# Patient Record
Sex: MALE | Race: WHITE | NOT HISPANIC OR LATINO
[De-identification: names, ages, dates, MRNs, and addresses within clinical notes are randomized per-mention and may not be internally consistent; named-entity substitution may affect disease eponyms.]

---

## 2019-10-15 ENCOUNTER — APPOINTMENT (OUTPATIENT)
Dept: GASTROENTEROLOGY | Facility: CLINIC | Age: 74
End: 2019-10-15
Payer: MEDICARE

## 2019-10-15 VITALS
WEIGHT: 141 LBS | SYSTOLIC BLOOD PRESSURE: 120 MMHG | HEART RATE: 41 BPM | BODY MASS INDEX: 22.13 KG/M2 | OXYGEN SATURATION: 98 % | HEIGHT: 67 IN | DIASTOLIC BLOOD PRESSURE: 70 MMHG

## 2019-10-15 DIAGNOSIS — C49.9 MALIGNANT NEOPLASM OF CONNECTIVE AND SOFT TISSUE, UNSPECIFIED: ICD-10-CM

## 2019-10-15 DIAGNOSIS — K57.90 DIVERTICULOSIS OF INTESTINE, PART UNSPECIFIED, W/OUT PERFORATION OR ABSCESS W/OUT BLEEDING: ICD-10-CM

## 2019-10-15 DIAGNOSIS — K86.89 OTHER SPECIFIED DISEASES OF PANCREAS: ICD-10-CM

## 2019-10-15 DIAGNOSIS — K59.09 OTHER CONSTIPATION: ICD-10-CM

## 2019-10-15 PROCEDURE — 99204 OFFICE O/P NEW MOD 45 MIN: CPT

## 2019-10-15 NOTE — HISTORY OF PRESENT ILLNESS
[de-identified] : Dictation inactive\par \par 74 yr male, history of gastric sarcoma, resection, now recurrence.\par Chronic constipation, better with fiber, Consyl\par No bleeding\par Colonoscopy around 1 yr ago Dr Kingsley, diverticulosis\par \par SH retired

## 2019-10-15 NOTE — PHYSICAL EXAM
[General Appearance - Alert] : alert [General Appearance - In No Acute Distress] : in no acute distress [Sclera] : the sclera and conjunctiva were normal [Extraocular Movements] : extraocular movements were intact [PERRL With Normal Accommodation] : pupils were equal in size, round, and reactive to light [Outer Ear] : the ears and nose were normal in appearance [Oropharynx] : the oropharynx was normal [Neck Appearance] : the appearance of the neck was normal [Neck Cervical Mass (___cm)] : no neck mass was observed [Jugular Venous Distention Increased] : there was no jugular-venous distention [Thyroid Nodule] : there were no palpable thyroid nodules [Thyroid Diffuse Enlargement] : the thyroid was not enlarged [Auscultation Breath Sounds / Voice Sounds] : lungs were clear to auscultation bilaterally [Heart Rate And Rhythm] : heart rate was normal and rhythm regular [Heart Sounds] : normal S1 and S2 [Heart Sounds Gallop] : no gallops [Murmurs] : no murmurs [Heart Sounds Pericardial Friction Rub] : no pericardial rub [Bowel Sounds] : normal bowel sounds [Edema] : there was no peripheral edema [Abdomen Soft] : soft [Abdomen Tenderness] : non-tender [Abdomen Mass (___ Cm)] : no abdominal mass palpated [Cervical Lymph Nodes Enlarged Posterior Bilaterally] : posterior cervical [Axillary Lymph Nodes Enlarged Bilaterally] : axillary [Cervical Lymph Nodes Enlarged Anterior Bilaterally] : anterior cervical [Supraclavicular Lymph Nodes Enlarged Bilaterally] : supraclavicular [Inguinal Lymph Nodes Enlarged Bilaterally] : inguinal [Femoral Lymph Nodes Enlarged Bilaterally] : femoral [No CVA Tenderness] : no ~M costovertebral angle tenderness [No Spinal Tenderness] : no spinal tenderness [Abnormal Walk] : normal gait [Nail Clubbing] : no clubbing  or cyanosis of the fingernails [Musculoskeletal - Swelling] : no joint swelling seen [Motor Tone] : muscle strength and tone were normal [Skin Color & Pigmentation] : normal skin color and pigmentation [Skin Turgor] : normal skin turgor [] : no rash [Impaired Insight] : insight and judgment were intact [Oriented To Time, Place, And Person] : oriented to person, place, and time [Affect] : the affect was normal [FreeTextEntry1] : scar

## 2019-10-15 NOTE — ASSESSMENT
[FreeTextEntry1] : 74 yr male\par Complex gi history, extensive resections for gastric sarcoma\par Functional constipation\par ? pancreatic insufficiency\par \par Plan\par Continue fiber\par FU with [facility]\par OV 4 months

## 2019-10-28 ENCOUNTER — EMERGENCY (EMERGENCY)
Facility: HOSPITAL | Age: 74
LOS: 1 days | Discharge: ANOTHER HOSPITAL | End: 2019-10-28
Attending: EMERGENCY MEDICINE | Admitting: EMERGENCY MEDICINE
Payer: MEDICARE

## 2019-10-28 VITALS
OXYGEN SATURATION: 100 % | RESPIRATION RATE: 15 BRPM | SYSTOLIC BLOOD PRESSURE: 137 MMHG | DIASTOLIC BLOOD PRESSURE: 89 MMHG | HEART RATE: 71 BPM | HEIGHT: 67 IN | WEIGHT: 139.99 LBS | TEMPERATURE: 99 F

## 2019-10-28 LAB
ALBUMIN SERPL ELPH-MCNC: 4.8 G/DL — SIGNIFICANT CHANGE UP (ref 3.3–5)
ALP SERPL-CCNC: 135 U/L — HIGH (ref 30–120)
ALT FLD-CCNC: 18 U/L DA — SIGNIFICANT CHANGE UP (ref 10–60)
ANION GAP SERPL CALC-SCNC: 10 MMOL/L — SIGNIFICANT CHANGE UP (ref 5–17)
APPEARANCE UR: CLEAR — SIGNIFICANT CHANGE UP
AST SERPL-CCNC: 25 U/L — SIGNIFICANT CHANGE UP (ref 10–40)
BASOPHILS # BLD AUTO: 0.11 K/UL — SIGNIFICANT CHANGE UP (ref 0–0.2)
BASOPHILS NFR BLD AUTO: 1.6 % — SIGNIFICANT CHANGE UP (ref 0–2)
BILIRUB SERPL-MCNC: 0.4 MG/DL — SIGNIFICANT CHANGE UP (ref 0.2–1.2)
BILIRUB UR-MCNC: NEGATIVE — SIGNIFICANT CHANGE UP
BUN SERPL-MCNC: 22 MG/DL — SIGNIFICANT CHANGE UP (ref 7–23)
CALCIUM SERPL-MCNC: 9.7 MG/DL — SIGNIFICANT CHANGE UP (ref 8.4–10.5)
CHLORIDE SERPL-SCNC: 101 MMOL/L — SIGNIFICANT CHANGE UP (ref 96–108)
CO2 SERPL-SCNC: 28 MMOL/L — SIGNIFICANT CHANGE UP (ref 22–31)
COLOR SPEC: YELLOW — SIGNIFICANT CHANGE UP
CREAT SERPL-MCNC: 1.67 MG/DL — HIGH (ref 0.5–1.3)
DIFF PNL FLD: NEGATIVE — SIGNIFICANT CHANGE UP
EOSINOPHIL # BLD AUTO: 0.23 K/UL — SIGNIFICANT CHANGE UP (ref 0–0.5)
EOSINOPHIL NFR BLD AUTO: 3.4 % — SIGNIFICANT CHANGE UP (ref 0–6)
GLUCOSE SERPL-MCNC: 94 MG/DL — SIGNIFICANT CHANGE UP (ref 70–99)
GLUCOSE UR QL: NEGATIVE MG/DL — SIGNIFICANT CHANGE UP
HCT VFR BLD CALC: 41.9 % — SIGNIFICANT CHANGE UP (ref 39–50)
HGB BLD-MCNC: 14.3 G/DL — SIGNIFICANT CHANGE UP (ref 13–17)
IMM GRANULOCYTES NFR BLD AUTO: 0.3 % — SIGNIFICANT CHANGE UP (ref 0–1.5)
KETONES UR-MCNC: NEGATIVE — SIGNIFICANT CHANGE UP
LACTATE SERPL-SCNC: 2.4 MMOL/L — HIGH (ref 0.7–2)
LEUKOCYTE ESTERASE UR-ACNC: NEGATIVE — SIGNIFICANT CHANGE UP
LIDOCAIN IGE QN: 212 U/L — SIGNIFICANT CHANGE UP (ref 73–393)
LYMPHOCYTES # BLD AUTO: 3.3 K/UL — SIGNIFICANT CHANGE UP (ref 1–3.3)
LYMPHOCYTES # BLD AUTO: 48.4 % — HIGH (ref 13–44)
MCHC RBC-ENTMCNC: 34.1 GM/DL — SIGNIFICANT CHANGE UP (ref 32–36)
MCHC RBC-ENTMCNC: 36.7 PG — HIGH (ref 27–34)
MCV RBC AUTO: 107.4 FL — HIGH (ref 80–100)
MONOCYTES # BLD AUTO: 0.77 K/UL — SIGNIFICANT CHANGE UP (ref 0–0.9)
MONOCYTES NFR BLD AUTO: 11.3 % — SIGNIFICANT CHANGE UP (ref 2–14)
NEUTROPHILS # BLD AUTO: 2.39 K/UL — SIGNIFICANT CHANGE UP (ref 1.8–7.4)
NEUTROPHILS NFR BLD AUTO: 35 % — LOW (ref 43–77)
NITRITE UR-MCNC: NEGATIVE — SIGNIFICANT CHANGE UP
NRBC # BLD: 0 /100 WBCS — SIGNIFICANT CHANGE UP (ref 0–0)
PH UR: 5 — SIGNIFICANT CHANGE UP (ref 5–8)
PLATELET # BLD AUTO: 262 K/UL — SIGNIFICANT CHANGE UP (ref 150–400)
POTASSIUM SERPL-MCNC: 4 MMOL/L — SIGNIFICANT CHANGE UP (ref 3.5–5.3)
POTASSIUM SERPL-SCNC: 4 MMOL/L — SIGNIFICANT CHANGE UP (ref 3.5–5.3)
PROT SERPL-MCNC: 8.7 G/DL — HIGH (ref 6–8.3)
PROT UR-MCNC: NEGATIVE MG/DL — SIGNIFICANT CHANGE UP
RBC # BLD: 3.9 M/UL — LOW (ref 4.2–5.8)
RBC # FLD: 14.3 % — SIGNIFICANT CHANGE UP (ref 10.3–14.5)
SODIUM SERPL-SCNC: 139 MMOL/L — SIGNIFICANT CHANGE UP (ref 135–145)
SP GR SPEC: 1.02 — SIGNIFICANT CHANGE UP (ref 1.01–1.02)
TROPONIN I SERPL-MCNC: 0.01 NG/ML — LOW (ref 0.02–0.06)
UROBILINOGEN FLD QL: NEGATIVE MG/DL — SIGNIFICANT CHANGE UP
WBC # BLD: 6.82 K/UL — SIGNIFICANT CHANGE UP (ref 3.8–10.5)
WBC # FLD AUTO: 6.82 K/UL — SIGNIFICANT CHANGE UP (ref 3.8–10.5)

## 2019-10-28 PROCEDURE — 99285 EMERGENCY DEPT VISIT HI MDM: CPT

## 2019-10-28 PROCEDURE — 93010 ELECTROCARDIOGRAM REPORT: CPT

## 2019-10-28 RX ORDER — SODIUM CHLORIDE 9 MG/ML
1000 INJECTION INTRAMUSCULAR; INTRAVENOUS; SUBCUTANEOUS ONCE
Refills: 0 | Status: COMPLETED | OUTPATIENT
Start: 2019-10-28 | End: 2019-10-28

## 2019-10-28 RX ORDER — HYDROMORPHONE HYDROCHLORIDE 2 MG/ML
0.5 INJECTION INTRAMUSCULAR; INTRAVENOUS; SUBCUTANEOUS ONCE
Refills: 0 | Status: DISCONTINUED | OUTPATIENT
Start: 2019-10-28 | End: 2019-10-28

## 2019-10-28 RX ORDER — HYDROMORPHONE HYDROCHLORIDE 2 MG/ML
1 INJECTION INTRAMUSCULAR; INTRAVENOUS; SUBCUTANEOUS ONCE
Refills: 0 | Status: DISCONTINUED | OUTPATIENT
Start: 2019-10-28 | End: 2019-10-28

## 2019-10-28 RX ADMIN — SODIUM CHLORIDE 500 MILLILITER(S): 9 INJECTION INTRAMUSCULAR; INTRAVENOUS; SUBCUTANEOUS at 23:02

## 2019-10-28 RX ADMIN — HYDROMORPHONE HYDROCHLORIDE 0.5 MILLIGRAM(S): 2 INJECTION INTRAMUSCULAR; INTRAVENOUS; SUBCUTANEOUS at 23:01

## 2019-10-28 NOTE — ED PROVIDER NOTE - CONSTITUTIONAL, MLM
normal... WD, well nourished, awake, alert, oriented to person, place, time/situation and appears in pain holding abd.

## 2019-10-28 NOTE — ED PROVIDER NOTE - OBJECTIVE STATEMENT
75 y/o male with a PMHx of liposarcoma of the abd, asthma, afib, BPH, benign meningioma, HLD, HTN, lung infection, scarlet fever, tinnitus presents to the ED c/o sharp and severe abd pain. Pt is having trouble going to the bathroom and is not passing gas. Denies nausea, vomiting, fever, urinary sx. Pt states the pain came on all of a sudden and waxes and wanes. Pt has not had this pain before. PCP: Dr. Moyer Zamorano Gravity: Dr. Hurley Surgeon: Dr. Ragland

## 2019-10-28 NOTE — ED PROVIDER NOTE - PROGRESS NOTE DETAILS
discussed results with pt of SBO and elevated lactate, concern for ischemic bowel on differential, pt states he will not be seen by a surgeon here, states he will only be seen by Dr. Sawyer at [facility], if he needs to sign himself out to go there he will pt's pain waxing and waning, giving another dose dilaudid, waiting for transfer to [facility]

## 2019-10-28 NOTE — ED PROVIDER NOTE - NSPREEXISTRELATION_ED_A_ED_FT
pt with h/o abdominal cancer s/p multiple complex abdominal surgeries, now with SBO and elevated lactate, given that pt's surgeon is at [facility], pt states will only see Dr. Sawyer, and reasonably expect pt would have best outcome if seen/treated by physician who has performed previous complex surgeries

## 2019-10-28 NOTE — ED PROVIDER NOTE - PSH
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

## 2019-10-28 NOTE — ED PROVIDER NOTE - PMH
Asthma  resolved about 15 years ago, ne moeds  Atrial Fibrillation  with use of gingko  Benign Meningioma    BPH (Benign Prostatic Hypertrophy)    Diverticulosis  colitis  E Coli Infection  2008  Hypercholesterolemia    Hypertension    Lung Infection  viral infection 1995  Scarlet Fever    Tinnitus  since menigioma surgery

## 2019-10-28 NOTE — ED PROVIDER NOTE - NOTES
left message with service call for possible transfer, requires acceptance by surgeon first left message with service; 0030 - pt accepted for transfer to ED by Dr. Sawyer, we will arrange transfer call for possible transfer, requires acceptance by surgeon first; 0034 - accepted to ED by Dr. Simpson

## 2019-10-28 NOTE — ED PROVIDER NOTE - NS_EDPROVIDERDISPOUSERTYPE_ED_A_ED
Scribe Attestation (For Scribes USE Only)... normal... Scribe Attestation (For Scribes USE Only).../Attending Attestation (For Attendings USE Only)...

## 2019-10-28 NOTE — ED PROVIDER NOTE - CLINICAL SUMMARY MEDICAL DECISION MAKING FREE TEXT BOX
74 y.o. M with complex abdominal surgery due to cancer, now with acute intense abd pain, no fever, no vomiting, iv, labs, lactate, ct a/p

## 2019-10-29 VITALS — DIASTOLIC BLOOD PRESSURE: 87 MMHG | RESPIRATION RATE: 16 BRPM | SYSTOLIC BLOOD PRESSURE: 159 MMHG | HEART RATE: 78 BPM

## 2019-10-29 LAB
APTT BLD: 30.3 SEC — SIGNIFICANT CHANGE UP (ref 28.5–37)
INR BLD: 0.99 RATIO — SIGNIFICANT CHANGE UP (ref 0.88–1.16)
LACTATE SERPL-SCNC: 1.2 MMOL/L — SIGNIFICANT CHANGE UP (ref 0.7–2)
PROTHROM AB SERPL-ACNC: 10.8 SEC — SIGNIFICANT CHANGE UP (ref 10–12.9)

## 2019-10-29 PROCEDURE — 93005 ELECTROCARDIOGRAM TRACING: CPT

## 2019-10-29 PROCEDURE — 96361 HYDRATE IV INFUSION ADD-ON: CPT

## 2019-10-29 PROCEDURE — 36415 COLL VENOUS BLD VENIPUNCTURE: CPT

## 2019-10-29 PROCEDURE — 85730 THROMBOPLASTIN TIME PARTIAL: CPT

## 2019-10-29 PROCEDURE — 85610 PROTHROMBIN TIME: CPT

## 2019-10-29 PROCEDURE — 85027 COMPLETE CBC AUTOMATED: CPT

## 2019-10-29 PROCEDURE — 99285 EMERGENCY DEPT VISIT HI MDM: CPT | Mod: 25

## 2019-10-29 PROCEDURE — 96376 TX/PRO/DX INJ SAME DRUG ADON: CPT

## 2019-10-29 PROCEDURE — 83690 ASSAY OF LIPASE: CPT

## 2019-10-29 PROCEDURE — 81003 URINALYSIS AUTO W/O SCOPE: CPT

## 2019-10-29 PROCEDURE — 83605 ASSAY OF LACTIC ACID: CPT

## 2019-10-29 PROCEDURE — 74176 CT ABD & PELVIS W/O CONTRAST: CPT | Mod: 26

## 2019-10-29 PROCEDURE — 74176 CT ABD & PELVIS W/O CONTRAST: CPT

## 2019-10-29 PROCEDURE — 84484 ASSAY OF TROPONIN QUANT: CPT

## 2019-10-29 PROCEDURE — 80053 COMPREHEN METABOLIC PANEL: CPT

## 2019-10-29 PROCEDURE — 96374 THER/PROPH/DIAG INJ IV PUSH: CPT

## 2019-10-29 RX ORDER — HYDROMORPHONE HYDROCHLORIDE 2 MG/ML
1 INJECTION INTRAMUSCULAR; INTRAVENOUS; SUBCUTANEOUS ONCE
Refills: 0 | Status: DISCONTINUED | OUTPATIENT
Start: 2019-10-29 | End: 2019-10-29

## 2019-10-29 RX ADMIN — SODIUM CHLORIDE 1000 MILLILITER(S): 9 INJECTION INTRAMUSCULAR; INTRAVENOUS; SUBCUTANEOUS at 00:00

## 2019-10-29 RX ADMIN — HYDROMORPHONE HYDROCHLORIDE 1 MILLIGRAM(S): 2 INJECTION INTRAMUSCULAR; INTRAVENOUS; SUBCUTANEOUS at 00:00

## 2019-10-29 RX ADMIN — SODIUM CHLORIDE 1000 MILLILITER(S): 9 INJECTION INTRAMUSCULAR; INTRAVENOUS; SUBCUTANEOUS at 01:00

## 2019-10-29 RX ADMIN — SODIUM CHLORIDE 1000 MILLILITER(S): 9 INJECTION INTRAMUSCULAR; INTRAVENOUS; SUBCUTANEOUS at 00:02

## 2019-10-29 RX ADMIN — HYDROMORPHONE HYDROCHLORIDE 1 MILLIGRAM(S): 2 INJECTION INTRAMUSCULAR; INTRAVENOUS; SUBCUTANEOUS at 00:49

## 2019-10-29 RX ADMIN — HYDROMORPHONE HYDROCHLORIDE 1 MILLIGRAM(S): 2 INJECTION INTRAMUSCULAR; INTRAVENOUS; SUBCUTANEOUS at 01:00

## 2019-10-29 RX ADMIN — HYDROMORPHONE HYDROCHLORIDE 1 MILLIGRAM(S): 2 INJECTION INTRAMUSCULAR; INTRAVENOUS; SUBCUTANEOUS at 01:49

## 2019-10-29 RX ADMIN — HYDROMORPHONE HYDROCHLORIDE 0.5 MILLIGRAM(S): 2 INJECTION INTRAMUSCULAR; INTRAVENOUS; SUBCUTANEOUS at 00:02

## 2019-10-29 NOTE — ED ADULT NURSE NOTE - PSH
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

## 2020-02-04 ENCOUNTER — APPOINTMENT (OUTPATIENT)
Dept: GASTROENTEROLOGY | Facility: CLINIC | Age: 75
End: 2020-02-04

## 2020-11-21 ENCOUNTER — EMERGENCY (EMERGENCY)
Facility: HOSPITAL | Age: 75
LOS: 1 days | Discharge: HOME | End: 2020-11-21
Attending: EMERGENCY MEDICINE | Admitting: EMERGENCY MEDICINE
Payer: MEDICARE

## 2020-11-21 VITALS
HEART RATE: 70 BPM | OXYGEN SATURATION: 98 % | SYSTOLIC BLOOD PRESSURE: 110 MMHG | DIASTOLIC BLOOD PRESSURE: 70 MMHG | TEMPERATURE: 99 F | RESPIRATION RATE: 18 BRPM

## 2020-11-21 VITALS
WEIGHT: 141.1 LBS | TEMPERATURE: 101 F | HEART RATE: 87 BPM | DIASTOLIC BLOOD PRESSURE: 69 MMHG | SYSTOLIC BLOOD PRESSURE: 128 MMHG | HEIGHT: 66 IN | RESPIRATION RATE: 16 BRPM | OXYGEN SATURATION: 98 %

## 2020-11-21 LAB
ALBUMIN SERPL ELPH-MCNC: 3.6 G/DL — SIGNIFICANT CHANGE UP (ref 3.3–5)
ALP SERPL-CCNC: 158 U/L — HIGH (ref 30–120)
ALT FLD-CCNC: 28 U/L DA — SIGNIFICANT CHANGE UP (ref 10–60)
ANION GAP SERPL CALC-SCNC: 8 MMOL/L — SIGNIFICANT CHANGE UP (ref 5–17)
APPEARANCE UR: CLEAR — SIGNIFICANT CHANGE UP
APTT BLD: 29.2 SEC — SIGNIFICANT CHANGE UP (ref 27.5–35.5)
AST SERPL-CCNC: 26 U/L — SIGNIFICANT CHANGE UP (ref 10–40)
BASOPHILS # BLD AUTO: 0.04 K/UL — SIGNIFICANT CHANGE UP (ref 0–0.2)
BASOPHILS NFR BLD AUTO: 0.6 % — SIGNIFICANT CHANGE UP (ref 0–2)
BILIRUB SERPL-MCNC: 0.3 MG/DL — SIGNIFICANT CHANGE UP (ref 0.2–1.2)
BILIRUB UR-MCNC: NEGATIVE — SIGNIFICANT CHANGE UP
BUN SERPL-MCNC: 17 MG/DL — SIGNIFICANT CHANGE UP (ref 7–23)
CALCIUM SERPL-MCNC: 8.6 MG/DL — SIGNIFICANT CHANGE UP (ref 8.4–10.5)
CHLORIDE SERPL-SCNC: 99 MMOL/L — SIGNIFICANT CHANGE UP (ref 96–108)
CO2 SERPL-SCNC: 28 MMOL/L — SIGNIFICANT CHANGE UP (ref 22–31)
COLOR SPEC: YELLOW — SIGNIFICANT CHANGE UP
CREAT SERPL-MCNC: 1.78 MG/DL — HIGH (ref 0.5–1.3)
DIFF PNL FLD: NEGATIVE — SIGNIFICANT CHANGE UP
EOSINOPHIL # BLD AUTO: 0.02 K/UL — SIGNIFICANT CHANGE UP (ref 0–0.5)
EOSINOPHIL NFR BLD AUTO: 0.3 % — SIGNIFICANT CHANGE UP (ref 0–6)
FLU A RESULT: SIGNIFICANT CHANGE UP
FLU A RESULT: SIGNIFICANT CHANGE UP
FLUAV AG NPH QL: SIGNIFICANT CHANGE UP
FLUBV AG NPH QL: SIGNIFICANT CHANGE UP
GLUCOSE SERPL-MCNC: 193 MG/DL — HIGH (ref 70–99)
GLUCOSE UR QL: NEGATIVE MG/DL — SIGNIFICANT CHANGE UP
HCT VFR BLD CALC: 36.9 % — LOW (ref 39–50)
HGB BLD-MCNC: 12.3 G/DL — LOW (ref 13–17)
IMM GRANULOCYTES NFR BLD AUTO: 0.4 % — SIGNIFICANT CHANGE UP (ref 0–1.5)
INR BLD: 1.1 RATIO — SIGNIFICANT CHANGE UP (ref 0.88–1.16)
KETONES UR-MCNC: NEGATIVE — SIGNIFICANT CHANGE UP
LACTATE SERPL-SCNC: 1.4 MMOL/L — SIGNIFICANT CHANGE UP (ref 0.7–2)
LACTATE SERPL-SCNC: 2.5 MMOL/L — HIGH (ref 0.7–2)
LEUKOCYTE ESTERASE UR-ACNC: NEGATIVE — SIGNIFICANT CHANGE UP
LYMPHOCYTES # BLD AUTO: 1.81 K/UL — SIGNIFICANT CHANGE UP (ref 1–3.3)
LYMPHOCYTES # BLD AUTO: 25 % — SIGNIFICANT CHANGE UP (ref 13–44)
MCHC RBC-ENTMCNC: 33.3 GM/DL — SIGNIFICANT CHANGE UP (ref 32–36)
MCHC RBC-ENTMCNC: 35.1 PG — HIGH (ref 27–34)
MCV RBC AUTO: 105.4 FL — HIGH (ref 80–100)
MONOCYTES # BLD AUTO: 0.9 K/UL — SIGNIFICANT CHANGE UP (ref 0–0.9)
MONOCYTES NFR BLD AUTO: 12.4 % — SIGNIFICANT CHANGE UP (ref 2–14)
NEUTROPHILS # BLD AUTO: 4.43 K/UL — SIGNIFICANT CHANGE UP (ref 1.8–7.4)
NEUTROPHILS NFR BLD AUTO: 61.3 % — SIGNIFICANT CHANGE UP (ref 43–77)
NITRITE UR-MCNC: NEGATIVE — SIGNIFICANT CHANGE UP
NRBC # BLD: 0 /100 WBCS — SIGNIFICANT CHANGE UP (ref 0–0)
PH UR: 6 — SIGNIFICANT CHANGE UP (ref 5–8)
PLATELET # BLD AUTO: 164 K/UL — SIGNIFICANT CHANGE UP (ref 150–400)
POTASSIUM SERPL-MCNC: 3.7 MMOL/L — SIGNIFICANT CHANGE UP (ref 3.5–5.3)
POTASSIUM SERPL-SCNC: 3.7 MMOL/L — SIGNIFICANT CHANGE UP (ref 3.5–5.3)
PROT SERPL-MCNC: 7 G/DL — SIGNIFICANT CHANGE UP (ref 6–8.3)
PROT UR-MCNC: NEGATIVE MG/DL — SIGNIFICANT CHANGE UP
PROTHROM AB SERPL-ACNC: 13.3 SEC — SIGNIFICANT CHANGE UP (ref 10.6–13.6)
RBC # BLD: 3.5 M/UL — LOW (ref 4.2–5.8)
RBC # FLD: 14.9 % — HIGH (ref 10.3–14.5)
RSV RESULT: SIGNIFICANT CHANGE UP
RSV RNA RESP QL NAA+PROBE: SIGNIFICANT CHANGE UP
SARS-COV-2 RNA SPEC QL NAA+PROBE: SIGNIFICANT CHANGE UP
SODIUM SERPL-SCNC: 135 MMOL/L — SIGNIFICANT CHANGE UP (ref 135–145)
SP GR SPEC: 1.01 — SIGNIFICANT CHANGE UP (ref 1.01–1.02)
UROBILINOGEN FLD QL: NEGATIVE MG/DL — SIGNIFICANT CHANGE UP
WBC # BLD: 7.23 K/UL — SIGNIFICANT CHANGE UP (ref 3.8–10.5)
WBC # FLD AUTO: 7.23 K/UL — SIGNIFICANT CHANGE UP (ref 3.8–10.5)

## 2020-11-21 PROCEDURE — 81003 URINALYSIS AUTO W/O SCOPE: CPT

## 2020-11-21 PROCEDURE — 99284 EMERGENCY DEPT VISIT MOD MDM: CPT | Mod: 25

## 2020-11-21 PROCEDURE — 96367 TX/PROPH/DG ADDL SEQ IV INF: CPT

## 2020-11-21 PROCEDURE — 85610 PROTHROMBIN TIME: CPT

## 2020-11-21 PROCEDURE — 96365 THER/PROPH/DIAG IV INF INIT: CPT

## 2020-11-21 PROCEDURE — 93005 ELECTROCARDIOGRAM TRACING: CPT

## 2020-11-21 PROCEDURE — 99284 EMERGENCY DEPT VISIT MOD MDM: CPT

## 2020-11-21 PROCEDURE — 85730 THROMBOPLASTIN TIME PARTIAL: CPT

## 2020-11-21 PROCEDURE — 87040 BLOOD CULTURE FOR BACTERIA: CPT

## 2020-11-21 PROCEDURE — 83605 ASSAY OF LACTIC ACID: CPT

## 2020-11-21 PROCEDURE — 87086 URINE CULTURE/COLONY COUNT: CPT

## 2020-11-21 PROCEDURE — 71045 X-RAY EXAM CHEST 1 VIEW: CPT | Mod: 26

## 2020-11-21 PROCEDURE — 85025 COMPLETE CBC W/AUTO DIFF WBC: CPT

## 2020-11-21 PROCEDURE — 36415 COLL VENOUS BLD VENIPUNCTURE: CPT

## 2020-11-21 PROCEDURE — 71045 X-RAY EXAM CHEST 1 VIEW: CPT

## 2020-11-21 PROCEDURE — 87635 SARS-COV-2 COVID-19 AMP PRB: CPT

## 2020-11-21 PROCEDURE — 93010 ELECTROCARDIOGRAM REPORT: CPT

## 2020-11-21 PROCEDURE — 80053 COMPREHEN METABOLIC PANEL: CPT

## 2020-11-21 PROCEDURE — 87631 RESP VIRUS 3-5 TARGETS: CPT

## 2020-11-21 RX ORDER — CEFTRIAXONE 500 MG/1
1000 INJECTION, POWDER, FOR SOLUTION INTRAMUSCULAR; INTRAVENOUS ONCE
Refills: 0 | Status: COMPLETED | OUTPATIENT
Start: 2020-11-21 | End: 2020-11-21

## 2020-11-21 RX ORDER — AZITHROMYCIN 500 MG/1
500 TABLET, FILM COATED ORAL ONCE
Refills: 0 | Status: COMPLETED | OUTPATIENT
Start: 2020-11-21 | End: 2020-11-21

## 2020-11-21 RX ORDER — SODIUM CHLORIDE 9 MG/ML
2000 INJECTION INTRAMUSCULAR; INTRAVENOUS; SUBCUTANEOUS ONCE
Refills: 0 | Status: COMPLETED | OUTPATIENT
Start: 2020-11-21 | End: 2020-11-21

## 2020-11-21 RX ORDER — ACETAMINOPHEN 500 MG
650 TABLET ORAL ONCE
Refills: 0 | Status: COMPLETED | OUTPATIENT
Start: 2020-11-21 | End: 2020-11-21

## 2020-11-21 RX ADMIN — CEFTRIAXONE 100 MILLIGRAM(S): 500 INJECTION, POWDER, FOR SOLUTION INTRAMUSCULAR; INTRAVENOUS at 17:29

## 2020-11-21 RX ADMIN — AZITHROMYCIN 500 MILLIGRAM(S): 500 TABLET, FILM COATED ORAL at 19:14

## 2020-11-21 RX ADMIN — CEFTRIAXONE 1000 MILLIGRAM(S): 500 INJECTION, POWDER, FOR SOLUTION INTRAMUSCULAR; INTRAVENOUS at 18:00

## 2020-11-21 RX ADMIN — Medication 650 MILLIGRAM(S): at 18:15

## 2020-11-21 RX ADMIN — Medication 650 MILLIGRAM(S): at 17:13

## 2020-11-21 RX ADMIN — SODIUM CHLORIDE 2000 MILLILITER(S): 9 INJECTION INTRAMUSCULAR; INTRAVENOUS; SUBCUTANEOUS at 17:10

## 2020-11-21 RX ADMIN — SODIUM CHLORIDE 2000 MILLILITER(S): 9 INJECTION INTRAMUSCULAR; INTRAVENOUS; SUBCUTANEOUS at 18:15

## 2020-11-21 RX ADMIN — AZITHROMYCIN 255 MILLIGRAM(S): 500 TABLET, FILM COATED ORAL at 18:14

## 2020-11-21 NOTE — ED PROVIDER NOTE - CHPI ED SYMPTOMS NEG
no dizziness, no nausea, no chest pain, no new abdominal pain, no dysuria, no hematuria, no frequency, no neck stiffness, no photophobia/no rash/no shortness of breath/no vomiting/no headache/no diarrhea

## 2020-11-21 NOTE — ED ADULT NURSE NOTE - PSH
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

## 2020-11-21 NOTE — ED PROVIDER NOTE - PSH
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

## 2020-11-21 NOTE — ED PROVIDER NOTE - OBJECTIVE STATEMENT
74 y/o male with a PMHx of A-fib, Asthma, Liposarcoma, s/p L kidney removal, s/p cholecystectomy, HTN, HLD, Benign meningioma, BPH, Scarlet fever, presents to the ED c/o chills and tactile fever since last night. Pt took temp PTA, wife told him he had a temp of almost 102F so came to the ED. Pt did not take any Tylenol or Motrin PTA. No headache, dizziness, n/v, chest pain, sob, new abd pain, diarrhea, dysuria, hematuria, frequency, rash, neck stiffness, or photophobia No known COVID-19 exposures.  PMD: Dr. Moyer   Oncologist: Dr. Hurley ([facility]) 76 y/o male with a PMHx of A-fib, Asthma, Liposarcoma, s/p L kidney removal, s/p left adrenal removal, s/p splenectomy, s/p cholecystectomy, HTN, HLD, Benign meningioma, BPH, Scarlet fever, presents to the ED c/o chills and tactile fever since last night. Pt took temp PTA, wife told him he had a temp of almost 102F so came to the ED. Pt did not take any Tylenol or Motrin PTA. No headache, dizziness, n/v, chest pain, sob, new abd pain, diarrhea, dysuria, hematuria, frequency, rash, neck stiffness, or photophobia No known COVID-19 exposures.  PMD: Dr. oMyer   Oncologist: Dr. Hurley ([facility])

## 2020-11-21 NOTE — ED ADULT NURSE NOTE - OBJECTIVE STATEMENT
Amb to ED. Pt states he has chills with a fever since this morning. Denies cough, vomiting, diarrhea or exposure to anyone with Covid. Color good. Skin hot & dry to touch. Lungs clear. Abd soft nontender.

## 2020-11-21 NOTE — ED PROVIDER NOTE - LAB INTERPRETATION
FLU A B RSV Detection by PCR (11.21.20 @ 17:32)   Flu A Result: NotDetec: The Flu A B RSV assay is a Real-Time PCR test for the qualitative   detection and differentiation of Influenza A, Influenza B, and   Respiratory Syncytial Virus on nasopharyngeal swabs. The results should   be interpreted in the context of all clinical and laboratory findings.   Flu B Result: NotDetec   RSV Result: NotDetec

## 2020-11-21 NOTE — ED ADULT TRIAGE NOTE - CHIEF COMPLAINT QUOTE
" I have fever and I have cancer  (Litosarcoma) " Pt denies direct exposure to anyone confirmed positive for Covid 19

## 2020-11-21 NOTE — ED PROVIDER NOTE - PATIENT PORTAL LINK FT
You can access the FollowMyHealth Patient Portal offered by [facility] by registering at the following website: [email or web address]. By joining [facility]’s FollowMyHealth portal, you will also be able to view your health information using other applications (apps) compatible with our system.

## 2020-11-21 NOTE — ED PROVIDER NOTE - NSFOLLOWUPINSTRUCTIONS_ED_ALL_ED_FT
Viral Syndrome    WHAT YOU NEED TO KNOW:    What is viral syndrome? Viral syndrome is a term used for symptoms of an infection caused by a virus. Viruses are spread easily from person to person through the air and on shared items.    What are the signs and symptoms of viral syndrome? Signs and symptoms may start slowly or suddenly and last hours to days. They can be mild to severe and can change over days or hours. You may have any of the following:  •Fever and chills      •A runny or stuffy nose      •Cough, sore throat, or hoarseness      •Headache, or pain and pressure around your eyes      •Muscle aches and joint pain      •Shortness of breath or wheezing      •Abdominal pain, cramps, and diarrhea      •Nausea, vomiting, or loss of appetite      How is viral syndrome diagnosed and treated? Your healthcare provider will ask about your symptoms and examine you. Antibiotics are not given for a viral infection. The following may help you feel better:   •Acetaminophen decreases pain and fever. It is available without a doctor's order. Ask how much to take and how often to take it. Follow directions. Read the labels of all other medicines you are using to see if they also contain acetaminophen, or ask your doctor or pharmacist. Acetaminophen can cause liver damage if not taken correctly. Do not use more than 4 grams (4,000 milligrams) total of acetaminophen in one day.       •NSAIDs, such as ibuprofen, help decrease swelling, pain, and fever. NSAIDs can cause stomach bleeding or kidney problems in certain people. If you take blood thinner medicine, always ask your healthcare provider if NSAIDs are safe for you. Always read the medicine label and follow directions.      •Cold medicine helps decrease swelling, control a cough, and relieve chest or nasal congestion.      •Saline nasal spray helps relieve congestion in your sinuses.      How can I manage my symptoms?   •Drink liquids as directed to prevent dehydration. Ask how much liquid to drink each day and which liquids are best for you. Ask if you should drink an oral rehydration solution (ORS). An ORS has the right amounts of water, salts, and sugar you need to replace body fluids. This may help prevent dehydration caused by vomiting or diarrhea. Do not drink liquids with caffeine. Liquids with caffeine can make dehydration worse.      •Get plenty of rest to help your body heal. Take naps throughout the day. Ask your healthcare provider when you can return to work and your normal activities.      •Use a cool mist humidifier to help you breathe easier. Ask your healthcare provider how to use a cool mist humidifier.      •Eat honey or use cough drops for a sore throat. Cough drops are available without a doctor's order. Follow directions for taking cough drops.      •Do not smoke or be close to anyone who is smoking. Nicotine and other chemicals in cigarettes and cigars can cause lung damage. Smoking can also delay healing. Ask your healthcare provider for information if you currently smoke and need help to quit. E-cigarettes or smokeless tobacco still contain nicotine. Talk to your healthcare provider before you use these products.      What can I do to prevent the spread of germs?          •Wash your hands often. Wash your hands several times each day. Wash after you use the bathroom, change a child's diaper, and before you prepare or eat food. Use soap and water every time. Rub your soapy hands together, lacing your fingers. Wash the front and back of your hands, and in between your fingers. Use the fingers of one hand to scrub under the fingernails of the other hand. Wash for at least 20 seconds. Rinse with warm, running water for several seconds. Then dry your hands with a clean towel or paper towel. Use hand  that contains alcohol if soap and water are not available. Do not touch your eyes, nose, or mouth without washing your hands first.  Handwashing           •Cover a sneeze or cough. Use a tissue that covers your mouth and nose. Throw the tissue away in a trash can right away. Use the bend of your arm if a tissue is not available. Wash your hands well with soap and water or use a hand .      •Stay away from others while you are sick. Avoid crowds as much as possible.      •Ask about vaccines you may need. Talk to your healthcare provider about your vaccine history. He or she will tell you which vaccines you need, and when to get them.?Get the influenza (flu) vaccine as soon as recommended each year. The flu vaccine is available starting in September or October. Flu viruses change, so it is important to get a flu vaccine every year.      ?Get the pneumonia vaccine if recommended. This vaccine is usually recommended every 5 years. Your provider will tell you when to get this vaccine, if needed.        Call your local emergency number (988 in the [place]) or have someone else call if:   •You have a seizure.      •You cannot be woken.      •You have chest pain or trouble breathing.      When should I seek immediate care?   •You have a stiff neck, a bad headache, and sensitivity to light.      •You feel weak, dizzy, or confused.      •You stop urinating or urinate a lot less than usual.      •You cough up blood or thick yellow or green mucus.      •You have severe abdominal pain or your abdomen is larger than usual.      When should I call my doctor?   •Your symptoms do not get better with treatment or get worse after 3 days.      •You have a rash or ear pain.      •You have burning when you urinate.      •You have questions or concerns about your condition or care.      CARE AGREEMENT:    You have the right to help plan your care. Learn about your health condition and how it may be treated. Discuss treatment options with your healthcare providers to decide what care you want to receive. You always have the right to refuse treatment

## 2020-11-22 LAB
CULTURE RESULTS: SIGNIFICANT CHANGE UP
SPECIMEN SOURCE: SIGNIFICANT CHANGE UP

## 2020-11-26 LAB
CULTURE RESULTS: SIGNIFICANT CHANGE UP
CULTURE RESULTS: SIGNIFICANT CHANGE UP
SPECIMEN SOURCE: SIGNIFICANT CHANGE UP
SPECIMEN SOURCE: SIGNIFICANT CHANGE UP

## 2021-02-10 ENCOUNTER — EMERGENCY (EMERGENCY)
Facility: HOSPITAL | Age: 76
LOS: 1 days | Discharge: ANOTHER HOSPITAL | End: 2021-02-10
Attending: EMERGENCY MEDICINE | Admitting: EMERGENCY MEDICINE
Payer: MEDICARE

## 2021-02-10 VITALS
OXYGEN SATURATION: 98 % | SYSTOLIC BLOOD PRESSURE: 138 MMHG | RESPIRATION RATE: 16 BRPM | DIASTOLIC BLOOD PRESSURE: 75 MMHG | HEART RATE: 56 BPM | WEIGHT: 139.99 LBS | HEIGHT: 66 IN | TEMPERATURE: 97 F

## 2021-02-10 VITALS
SYSTOLIC BLOOD PRESSURE: 123 MMHG | OXYGEN SATURATION: 98 % | RESPIRATION RATE: 18 BRPM | HEART RATE: 66 BPM | TEMPERATURE: 98 F | DIASTOLIC BLOOD PRESSURE: 70 MMHG

## 2021-02-10 LAB
ALBUMIN SERPL ELPH-MCNC: 3.8 G/DL — SIGNIFICANT CHANGE UP (ref 3.3–5)
ALP SERPL-CCNC: 126 U/L — HIGH (ref 30–120)
ALT FLD-CCNC: 16 U/L DA — SIGNIFICANT CHANGE UP (ref 10–60)
ANION GAP SERPL CALC-SCNC: 8 MMOL/L — SIGNIFICANT CHANGE UP (ref 5–17)
APTT BLD: 28.2 SEC — SIGNIFICANT CHANGE UP (ref 27.5–35.5)
AST SERPL-CCNC: 24 U/L — SIGNIFICANT CHANGE UP (ref 10–40)
BASOPHILS # BLD AUTO: 0.07 K/UL — SIGNIFICANT CHANGE UP (ref 0–0.2)
BASOPHILS NFR BLD AUTO: 1.2 % — SIGNIFICANT CHANGE UP (ref 0–2)
BILIRUB SERPL-MCNC: 0.2 MG/DL — SIGNIFICANT CHANGE UP (ref 0.2–1.2)
BUN SERPL-MCNC: 22 MG/DL — SIGNIFICANT CHANGE UP (ref 7–23)
CALCIUM SERPL-MCNC: 8.6 MG/DL — SIGNIFICANT CHANGE UP (ref 8.4–10.5)
CHLORIDE SERPL-SCNC: 103 MMOL/L — SIGNIFICANT CHANGE UP (ref 96–108)
CO2 SERPL-SCNC: 28 MMOL/L — SIGNIFICANT CHANGE UP (ref 22–31)
CREAT SERPL-MCNC: 1.5 MG/DL — HIGH (ref 0.5–1.3)
EOSINOPHIL # BLD AUTO: 0.06 K/UL — SIGNIFICANT CHANGE UP (ref 0–0.5)
EOSINOPHIL NFR BLD AUTO: 1.1 % — SIGNIFICANT CHANGE UP (ref 0–6)
GLUCOSE BLDC GLUCOMTR-MCNC: 86 MG/DL — SIGNIFICANT CHANGE UP (ref 70–99)
GLUCOSE SERPL-MCNC: 100 MG/DL — HIGH (ref 70–99)
HCT VFR BLD CALC: 34.6 % — LOW (ref 39–50)
HGB BLD-MCNC: 11.8 G/DL — LOW (ref 13–17)
IMM GRANULOCYTES NFR BLD AUTO: 0.2 % — SIGNIFICANT CHANGE UP (ref 0–1.5)
INR BLD: 1.01 RATIO — SIGNIFICANT CHANGE UP (ref 0.88–1.16)
LYMPHOCYTES # BLD AUTO: 2.59 K/UL — SIGNIFICANT CHANGE UP (ref 1–3.3)
LYMPHOCYTES # BLD AUTO: 45.8 % — HIGH (ref 13–44)
MCHC RBC-ENTMCNC: 34.1 GM/DL — SIGNIFICANT CHANGE UP (ref 32–36)
MCHC RBC-ENTMCNC: 36.2 PG — HIGH (ref 27–34)
MCV RBC AUTO: 106.1 FL — HIGH (ref 80–100)
MONOCYTES # BLD AUTO: 0.96 K/UL — HIGH (ref 0–0.9)
MONOCYTES NFR BLD AUTO: 17 % — HIGH (ref 2–14)
NEUTROPHILS # BLD AUTO: 1.97 K/UL — SIGNIFICANT CHANGE UP (ref 1.8–7.4)
NEUTROPHILS NFR BLD AUTO: 34.7 % — LOW (ref 43–77)
NRBC # BLD: 0 /100 WBCS — SIGNIFICANT CHANGE UP (ref 0–0)
PLATELET # BLD AUTO: 138 K/UL — LOW (ref 150–400)
POTASSIUM SERPL-MCNC: 4.1 MMOL/L — SIGNIFICANT CHANGE UP (ref 3.5–5.3)
POTASSIUM SERPL-SCNC: 4.1 MMOL/L — SIGNIFICANT CHANGE UP (ref 3.5–5.3)
PROT SERPL-MCNC: 7.3 G/DL — SIGNIFICANT CHANGE UP (ref 6–8.3)
PROTHROM AB SERPL-ACNC: 12.2 SEC — SIGNIFICANT CHANGE UP (ref 10.6–13.6)
RBC # BLD: 3.26 M/UL — LOW (ref 4.2–5.8)
RBC # FLD: 16.8 % — HIGH (ref 10.3–14.5)
SODIUM SERPL-SCNC: 139 MMOL/L — SIGNIFICANT CHANGE UP (ref 135–145)
WBC # BLD: 5.66 K/UL — SIGNIFICANT CHANGE UP (ref 3.8–10.5)
WBC # FLD AUTO: 5.66 K/UL — SIGNIFICANT CHANGE UP (ref 3.8–10.5)

## 2021-02-10 PROCEDURE — 71045 X-RAY EXAM CHEST 1 VIEW: CPT

## 2021-02-10 PROCEDURE — 70450 CT HEAD/BRAIN W/O DYE: CPT | Mod: 26,MH

## 2021-02-10 PROCEDURE — 36415 COLL VENOUS BLD VENIPUNCTURE: CPT

## 2021-02-10 PROCEDURE — 99285 EMERGENCY DEPT VISIT HI MDM: CPT

## 2021-02-10 PROCEDURE — 71045 X-RAY EXAM CHEST 1 VIEW: CPT | Mod: 26

## 2021-02-10 PROCEDURE — 80053 COMPREHEN METABOLIC PANEL: CPT

## 2021-02-10 PROCEDURE — 93010 ELECTROCARDIOGRAM REPORT: CPT

## 2021-02-10 PROCEDURE — 70450 CT HEAD/BRAIN W/O DYE: CPT

## 2021-02-10 PROCEDURE — 85610 PROTHROMBIN TIME: CPT

## 2021-02-10 PROCEDURE — 99285 EMERGENCY DEPT VISIT HI MDM: CPT | Mod: 25

## 2021-02-10 PROCEDURE — 93005 ELECTROCARDIOGRAM TRACING: CPT

## 2021-02-10 PROCEDURE — 85025 COMPLETE CBC W/AUTO DIFF WBC: CPT

## 2021-02-10 PROCEDURE — 85730 THROMBOPLASTIN TIME PARTIAL: CPT

## 2021-02-10 PROCEDURE — 82962 GLUCOSE BLOOD TEST: CPT

## 2021-02-10 RX ORDER — ASPIRIN/CALCIUM CARB/MAGNESIUM 324 MG
325 TABLET ORAL ONCE
Refills: 0 | Status: COMPLETED | OUTPATIENT
Start: 2021-02-10 | End: 2021-02-10

## 2021-02-10 RX ADMIN — Medication 325 MILLIGRAM(S): at 19:37

## 2021-02-10 NOTE — CONSULT NOTE ADULT - SUBJECTIVE AND OBJECTIVE BOX
Patient is a 75y old  Male who presents with a chief complaint of Tongue numbness/Left Face numbness    HPI: 74 y/o m with h/o Meningioma, resected from Right frontal lobe in past, h/o came to ED with c/o  Tongue numbness/Left Face numbness.  CT Head  -No acute pathology.  NIHSS - 1  Not a tPa candidate  - NIHSS is 1 with only sensory symptoms.    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   No h/o alcohol use.  Ex Smoker. Quite in past.  Pt smokes.  Pt does drink socially.  Pt drinks alcohol heavily.    FAMILY HISTORY:      REVIEW OF SYSTEMS:    CONSTITUTIONAL: No fever  EYES: No eye pain,   ENMT:  No sinus or throat pain  NECK: No pain or stiffness  RESPIRATORY: No cough, No hemoptysis; No shortness of breath  CARDIOVASCULAR: No acute chest pain, palpitations,  or leg swelling  GASTROINTESTINAL: No abdominal pain. No nausea, vomiting, or hematemesis;  No melena or hematochezia.  GENITOURINARY: No  hematuria, or incontinence  MUSCULOSKELETAL: No joint swelling; No extremity pain  SKIN: No itching, rashes, or lesions   LYMPH NODES: No enlarged glands  NEUROLOGICAL: No headaches, memory loss,   PSYCHIATRIC: No depression, anxiety, mood swings, or difficulty sleeping  ENDOCRINE: No heat or cold intolerance;   HEME/LYMPH: No easy bruising, or bleeding gums  Allergy/Immunology. No medication allergy. No seasonal allergies.    PHYSICAL EXAM:  Vital Signs Last 24 Hrs  T(F): 97.4 (02-10-21 @ 18:16)  HR: 56 (02-10-21 @ 18:16)  BP: 138/75 (02-10-21 @ 18:16)  RR: 16 (02-10-21 @ 18:16)    GENERAL: NAD, well-groomed, well-developed  HEAD:  Atraumatic, Normocephalic  EYES: EOMI, PERRLA, conjunctiva and sclera clear  NECK: Supple, No JVD, thyroid non-palpable    On Neurological Examination:    Mental Status - Pt is alert, awake, oriented X3. Higher functions are intact. Follows commands well and able to answer questions appropriately.    Speech -  Normal. Pt has no aphasia.    Cranial Nerves - Pupils 3 mm equal and reactive to light, extraocular eye movements intact. Pt has no visual field deficit.  Pt has no facial asymmetry. Tongue - is in midline.    Motor Exam - 4 plus/5 all over, No drift. No shaking or tremors. Muscle tone - is normal all over. Moves all extremities equally. No asymmetry is seen.      Sensory Exam -  complaints of Tongue numbness/Left Face numbness    Gait - Able to stand and walk unassisted. Pt is able to stand up with holding my hands and is able to walk for few feet around the bed. Not falling to either side.    Deep tendon Reflexes - 2 plus all over.    Coordination - Fine finger movements are normal on both sides. Finger to nose is also normal on both sides.       Romberg - Negative.    Neck Supple -  Yes.    LABS:                        11.8   5.66  )-----------( 138      ( 10 Feb 2021 18:44 )             34.6     RADIOLOGY & ADDITIONAL STUDIES:    c< from: CT Brain Stroke Protocol (02.10.21 @ 18:36) >    Mild encephalomalacia and gliosis in the posterior right frontal lobe beneath right-sided craniotomy. There is a history of previous meningioma resection.    Volume loss with microvascular disease, no acute hemorrhage or midline shift. If symptoms persist consider follow-up head CT or MR if no contraindications.    < end of copied text >     Patient is a 75y old  Male who presents with a chief complaint of Tongue numbness/Left Face arm and left leg numbness    HPI: 76 y/o m with h/o Meningioma, resected from Right frontal lobe in past, h/o came to ED with c/o  Tongue numbness/Left Face, Arm, leg numbness an hour prior to coming to hospital.  [place] some weakness but that is resolved.  CT Head  -No acute pathology.  NIHSS - 1  Not a tPa candidate  - NIHSS is 1 with only sensory symptoms.    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   No h/o alcohol use.    FAMILY HISTORY: Asked Pt. N/C    REVIEW OF SYSTEMS:    CONSTITUTIONAL: No fever  EYES: No eye pain,   ENMT:  No sinus or throat pain  NECK: No pain or stiffness  RESPIRATORY: No cough, No hemoptysis; No shortness of breath  CARDIOVASCULAR: No acute chest pain, palpitations,  or leg swelling  GASTROINTESTINAL: No abdominal pain. No nausea, vomiting, or hematemesis;  No melena or hematochezia.  GENITOURINARY: No  hematuria, or incontinence  MUSCULOSKELETAL: No joint swelling; No extremity pain  SKIN: No itching, rashes, or lesions   LYMPH NODES: No enlarged glands  NEUROLOGICAL: No headaches, memory loss, He/o meningioma resected from right frontal area  PSYCHIATRIC: No depression, anxiety, mood swings, or difficulty sleeping  ENDOCRINE: No heat or cold intolerance;   HEME/LYMPH: No easy bruising, or bleeding gums  Allergy/Immunology. No medication allergy. No seasonal allergies.   - Has only one kidney.    PHYSICAL EXAM:  Vital Signs Last 24 Hrs  T(F): 97.4 (02-10-21 @ 18:16)  HR: 56 (02-10-21 @ 18:16)  BP: 138/75 (02-10-21 @ 18:16)  RR: 16 (02-10-21 @ 18:16)    GENERAL: NAD, well-groomed, well-developed  HEAD:  Atraumatic, Normocephalic  EYES: EOMI, PERRLA, conjunctiva and sclera clear  NECK: Supple, No JVD, thyroid non-palpable    On Neurological Examination:    Mental Status - Pt is alert, awake, oriented X3. Higher functions are intact. Follows commands well and able to answer questions appropriately.    Speech -  Normal. Pt has no aphasia.    Cranial Nerves - Pupils 3 mm equal and reactive to light, extraocular eye movements intact. Pt has no visual field deficit.  Pt has no facial asymmetry. Tongue - is in midline.    Motor Exam - 4 plus/5 all over, No drift. No shaking or tremors. Muscle tone - is normal all over. Moves all extremities equally. No asymmetry is seen.      Sensory Exam -  complaints of Tongue numbness/Left Face, arm, leg numbness    Gait - Able to stand and walk unassisted.    Deep tendon Reflexes - 2 plus all over.    Coordination - Fine finger movements are normal on both sides. Finger to nose is also normal on both sides.       Romberg - Negative.    Neck Supple -  Yes.    LABS:                        11.8   5.66  )-----------( 138      ( 10 Feb 2021 18:44 )             34.6     RADIOLOGY & ADDITIONAL STUDIES:    c< from: CT Brain Stroke Protocol (02.10.21 @ 18:36) >    Mild encephalomalacia and gliosis in the posterior right frontal lobe beneath right-sided craniotomy. There is a history of previous meningioma resection.    Volume loss with microvascular disease, no acute hemorrhage or midline shift. If symptoms persist consider follow-up head CT or MR if no contraindications.    < end of copied text >

## 2021-02-10 NOTE — ED ADULT NURSE NOTE - DOES PATIENT HAVE ADVANCE DIRECTIVE
[Home] : patient was discharged to home [Follow Up Appt with Provider within 7 days] : follow up appt with provider within 7 days [Follow Up Call to Patient's Family] : follow up call to patient's family No

## 2021-02-10 NOTE — ED PROVIDER NOTE - CHPI ED SYMPTOMS NEG
no headache, no chest pain, no abdominal pain, no arm or leg sx, no slurred speech./no dizziness/no nausea/no vomiting

## 2021-02-10 NOTE — CONSULT NOTE ADULT - ASSESSMENT
Seen for Tongue numbness/Left Face numbness  CT Head  -No acute pathology.  NIHSS - 1  Not a tPa candidate  - NIHSS is 1 with only sensory symptoms.  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  Transfer to [facility] for CVA w/up.  MRI Brain/Carotid doppler/2D Echo.  Lipid panel/HbA1c.  Dysphagia eval in ED.  PT Eval/Speech and swallowing eval.  D/w ED physician Dr. Leon.  D/w Pt. at bedside. Questions answered.     Seen for Tongue numbness/Left Face, arm, leg numbness  CT Head  -No acute pathology.  NIHSS - 1  Not a tPa candidate  - NIHSS is 1 with only sensory symptoms.  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  H/o Leimyosarcoma of intestine.  R/o Mets  Focal seizure in right Frontal area  Transfer to [facility] for CVA/Mets w/up.  MRI Brain pre/post with gado/Carotid doppler/2D Echo.  Lipid panel/HbA1c.  H/o Afib not on AC.   Dysphagia eval in ED.  PT Eval/Speech and swallowing eval.  D/w ED physician Dr. Leon.  D/w Pt. at bedside. Questions answered.

## 2021-02-10 NOTE — ED ADULT TRIAGE NOTE - CHIEF COMPLAINT QUOTE
" I have numbness on my tongue and left side of my face lasted few seconds happened about an hour ago " " I have numbness on my tongue and left side of my face lasted few seconds happened about an hour ago " Stroke code activated

## 2021-02-10 NOTE — ED ADULT NURSE NOTE - CHIEF COMPLAINT QUOTE
" I have numbness on my tongue and left side of my face lasted few seconds happened about an hour ago " Stroke code activated

## 2021-02-10 NOTE — ED PROVIDER NOTE - OBJECTIVE STATEMENT
76 y/o male with a PMHx of A-fib, Asthma, Liposarcoma, s/p L kidney removal, s/p left adrenal removal, s/p splenectomy, s/p cholecystectomy, HTN, HLD, Benign meningioma, BPH, Scarlet fever, presents to the ED numbness to tongue and L sided face started at 5:30 PM today and lasted approximately 30 seconds and then improved. Pt states face feels "weird." Denies headache, dizziness, N/V, chest pain, abdominal pain, arm or leg sx, and slurred speech. Pt reports unable to have test with IV contrast due to elevated creatinine and hx of nephrectomy.    PMD: Dr. Duron 76 y/o male with a PMHx of A-fib, Asthma, Liposarcoma, s/p L kidney removal, s/p left adrenal removal, s/p splenectomy, s/p cholecystectomy, HTN, HLD, Benign meningioma, BPH, Scarlet fever, presents to the ED numbness to tongue and L sided face started at 5:30 PM today and lasted approximately 30 seconds and then improved. Pt states face feels "weird." Denies fevers, chills, headache, dizziness, N/V, chest pain, abdominal pain, arm or leg sx, and slurred speech. Pt reports unable to have test with IV contrast due to elevated creatinine and hx of nephrectomy.    PMD: Dr. Duron

## 2021-02-10 NOTE — ED PROVIDER NOTE - PROGRESS NOTE DETAILS
Scribe PS for ED attending, Dr. Leon: Pt passed his dysphagia screen. Mireille PS for ED attending, Dr. Leon: Spoke to Dr. Youssef, neurology consult. Will see pt. chan (neuro) seen eval pt, recommends xfer for admission and MRI. pt requests xfer to [facility] due to family member being doctor there d/w basia jean, he will accept xfer er->er Patient refused CTA head/neck.  States he only has 1 kidney and does not wish to have the dye-load

## 2021-02-10 NOTE — ED PROVIDER NOTE - CLINICAL SUMMARY MEDICAL DECISION MAKING FREE TEXT BOX
Pt with less than a minute of tongue and L sided facial numbness. Plan: EKG, x-ray. CT, labs, and neurology consult.

## 2021-11-27 ENCOUNTER — EMERGENCY (EMERGENCY)
Facility: HOSPITAL | Age: 76
LOS: 1 days | Discharge: HOME | End: 2021-11-27
Attending: EMERGENCY MEDICINE | Admitting: EMERGENCY MEDICINE
Payer: MEDICARE

## 2021-11-27 VITALS
HEART RATE: 90 BPM | DIASTOLIC BLOOD PRESSURE: 65 MMHG | RESPIRATION RATE: 18 BRPM | HEIGHT: 66 IN | OXYGEN SATURATION: 95 % | TEMPERATURE: 103 F | WEIGHT: 145.51 LBS | SYSTOLIC BLOOD PRESSURE: 118 MMHG

## 2021-11-27 PROCEDURE — 99284 EMERGENCY DEPT VISIT MOD MDM: CPT | Mod: CS

## 2021-11-27 RX ORDER — PALBOCICLIB 100 MG/1
1 CAPSULE ORAL
Qty: 0 | Refills: 0 | DISCHARGE

## 2021-11-27 RX ORDER — SUCRALFATE 1 G
10 TABLET ORAL
Qty: 0 | Refills: 0 | DISCHARGE

## 2021-11-28 VITALS
RESPIRATION RATE: 20 BRPM | DIASTOLIC BLOOD PRESSURE: 67 MMHG | HEART RATE: 82 BPM | TEMPERATURE: 99 F | SYSTOLIC BLOOD PRESSURE: 105 MMHG | OXYGEN SATURATION: 97 %

## 2021-11-28 DIAGNOSIS — Z90.411 ACQUIRED PARTIAL ABSENCE OF PANCREAS: Chronic | ICD-10-CM

## 2021-11-28 DIAGNOSIS — Z90.81 ACQUIRED ABSENCE OF SPLEEN: Chronic | ICD-10-CM

## 2021-11-28 DIAGNOSIS — Z90.5 ACQUIRED ABSENCE OF KIDNEY: Chronic | ICD-10-CM

## 2021-11-28 DIAGNOSIS — Z90.3 ACQUIRED ABSENCE OF STOMACH [PART OF]: Chronic | ICD-10-CM

## 2021-11-28 LAB
ALBUMIN SERPL ELPH-MCNC: 3.7 G/DL — SIGNIFICANT CHANGE UP (ref 3.3–5)
ALP SERPL-CCNC: 117 U/L — SIGNIFICANT CHANGE UP (ref 30–120)
ALT FLD-CCNC: 23 U/L DA — SIGNIFICANT CHANGE UP (ref 10–60)
ANION GAP SERPL CALC-SCNC: 12 MMOL/L — SIGNIFICANT CHANGE UP (ref 5–17)
APPEARANCE UR: CLEAR — SIGNIFICANT CHANGE UP
APTT BLD: 26.8 SEC — LOW (ref 27.5–35.5)
AST SERPL-CCNC: 24 U/L — SIGNIFICANT CHANGE UP (ref 10–40)
BASOPHILS # BLD AUTO: 0.03 K/UL — SIGNIFICANT CHANGE UP (ref 0–0.2)
BASOPHILS NFR BLD AUTO: 0.7 % — SIGNIFICANT CHANGE UP (ref 0–2)
BILIRUB SERPL-MCNC: 0.4 MG/DL — SIGNIFICANT CHANGE UP (ref 0.2–1.2)
BILIRUB UR-MCNC: NEGATIVE — SIGNIFICANT CHANGE UP
BUN SERPL-MCNC: 21 MG/DL — SIGNIFICANT CHANGE UP (ref 7–23)
CALCIUM SERPL-MCNC: 8.2 MG/DL — LOW (ref 8.4–10.5)
CHLORIDE SERPL-SCNC: 102 MMOL/L — SIGNIFICANT CHANGE UP (ref 96–108)
CO2 SERPL-SCNC: 23 MMOL/L — SIGNIFICANT CHANGE UP (ref 22–31)
COLOR SPEC: YELLOW — SIGNIFICANT CHANGE UP
CREAT SERPL-MCNC: 1.14 MG/DL — SIGNIFICANT CHANGE UP (ref 0.5–1.3)
DIFF PNL FLD: NEGATIVE — SIGNIFICANT CHANGE UP
EOSINOPHIL # BLD AUTO: 0.05 K/UL — SIGNIFICANT CHANGE UP (ref 0–0.5)
EOSINOPHIL NFR BLD AUTO: 1.1 % — SIGNIFICANT CHANGE UP (ref 0–6)
GLUCOSE SERPL-MCNC: 124 MG/DL — HIGH (ref 70–99)
GLUCOSE UR QL: NEGATIVE MG/DL — SIGNIFICANT CHANGE UP
HCT VFR BLD CALC: 35.6 % — LOW (ref 39–50)
HGB BLD-MCNC: 12 G/DL — LOW (ref 13–17)
IMM GRANULOCYTES NFR BLD AUTO: 0.2 % — SIGNIFICANT CHANGE UP (ref 0–1.5)
INR BLD: 1.01 RATIO — SIGNIFICANT CHANGE UP (ref 0.88–1.16)
KETONES UR-MCNC: ABNORMAL
LACTATE SERPL-SCNC: 1.5 MMOL/L — SIGNIFICANT CHANGE UP (ref 0.7–2)
LEUKOCYTE ESTERASE UR-ACNC: NEGATIVE — SIGNIFICANT CHANGE UP
LYMPHOCYTES # BLD AUTO: 0.51 K/UL — LOW (ref 1–3.3)
LYMPHOCYTES # BLD AUTO: 11.4 % — LOW (ref 13–44)
MCHC RBC-ENTMCNC: 33.7 GM/DL — SIGNIFICANT CHANGE UP (ref 32–36)
MCHC RBC-ENTMCNC: 36.3 PG — HIGH (ref 27–34)
MCV RBC AUTO: 107.6 FL — HIGH (ref 80–100)
MONOCYTES # BLD AUTO: 0.18 K/UL — SIGNIFICANT CHANGE UP (ref 0–0.9)
MONOCYTES NFR BLD AUTO: 4 % — SIGNIFICANT CHANGE UP (ref 2–14)
NEUTROPHILS # BLD AUTO: 3.71 K/UL — SIGNIFICANT CHANGE UP (ref 1.8–7.4)
NEUTROPHILS NFR BLD AUTO: 82.6 % — HIGH (ref 43–77)
NITRITE UR-MCNC: NEGATIVE — SIGNIFICANT CHANGE UP
NRBC # BLD: 0 /100 WBCS — SIGNIFICANT CHANGE UP (ref 0–0)
PH UR: 5 — SIGNIFICANT CHANGE UP (ref 5–8)
PLATELET # BLD AUTO: 325 K/UL — SIGNIFICANT CHANGE UP (ref 150–400)
POTASSIUM SERPL-MCNC: 4.3 MMOL/L — SIGNIFICANT CHANGE UP (ref 3.5–5.3)
POTASSIUM SERPL-SCNC: 4.3 MMOL/L — SIGNIFICANT CHANGE UP (ref 3.5–5.3)
PROT SERPL-MCNC: 7.1 G/DL — SIGNIFICANT CHANGE UP (ref 6–8.3)
PROT UR-MCNC: NEGATIVE MG/DL — SIGNIFICANT CHANGE UP
PROTHROM AB SERPL-ACNC: 12.2 SEC — SIGNIFICANT CHANGE UP (ref 10.6–13.6)
RBC # BLD: 3.31 M/UL — LOW (ref 4.2–5.8)
RBC # FLD: 15.2 % — HIGH (ref 10.3–14.5)
SARS-COV-2 RNA SPEC QL NAA+PROBE: SIGNIFICANT CHANGE UP
SODIUM SERPL-SCNC: 137 MMOL/L — SIGNIFICANT CHANGE UP (ref 135–145)
SP GR SPEC: 1.01 — SIGNIFICANT CHANGE UP (ref 1.01–1.02)
UROBILINOGEN FLD QL: NEGATIVE MG/DL — SIGNIFICANT CHANGE UP
WBC # BLD: 4.49 K/UL — SIGNIFICANT CHANGE UP (ref 3.8–10.5)
WBC # FLD AUTO: 4.49 K/UL — SIGNIFICANT CHANGE UP (ref 3.8–10.5)

## 2021-11-28 PROCEDURE — 85730 THROMBOPLASTIN TIME PARTIAL: CPT

## 2021-11-28 PROCEDURE — 87086 URINE CULTURE/COLONY COUNT: CPT

## 2021-11-28 PROCEDURE — 87040 BLOOD CULTURE FOR BACTERIA: CPT

## 2021-11-28 PROCEDURE — 36415 COLL VENOUS BLD VENIPUNCTURE: CPT

## 2021-11-28 PROCEDURE — 71045 X-RAY EXAM CHEST 1 VIEW: CPT

## 2021-11-28 PROCEDURE — 85025 COMPLETE CBC W/AUTO DIFF WBC: CPT

## 2021-11-28 PROCEDURE — 71045 X-RAY EXAM CHEST 1 VIEW: CPT | Mod: 26

## 2021-11-28 PROCEDURE — 96374 THER/PROPH/DIAG INJ IV PUSH: CPT

## 2021-11-28 PROCEDURE — 99284 EMERGENCY DEPT VISIT MOD MDM: CPT | Mod: 25

## 2021-11-28 PROCEDURE — 85610 PROTHROMBIN TIME: CPT

## 2021-11-28 PROCEDURE — 87635 SARS-COV-2 COVID-19 AMP PRB: CPT

## 2021-11-28 PROCEDURE — 80053 COMPREHEN METABOLIC PANEL: CPT

## 2021-11-28 PROCEDURE — 83605 ASSAY OF LACTIC ACID: CPT

## 2021-11-28 PROCEDURE — 96361 HYDRATE IV INFUSION ADD-ON: CPT

## 2021-11-28 PROCEDURE — 81003 URINALYSIS AUTO W/O SCOPE: CPT

## 2021-11-28 RX ORDER — SODIUM CHLORIDE 9 MG/ML
2000 INJECTION INTRAMUSCULAR; INTRAVENOUS; SUBCUTANEOUS ONCE
Refills: 0 | Status: COMPLETED | OUTPATIENT
Start: 2021-11-28 | End: 2021-11-28

## 2021-11-28 RX ORDER — ONDANSETRON 8 MG/1
4 TABLET, FILM COATED ORAL ONCE
Refills: 0 | Status: COMPLETED | OUTPATIENT
Start: 2021-11-28 | End: 2021-11-28

## 2021-11-28 RX ORDER — ACETAMINOPHEN 500 MG
1000 TABLET ORAL ONCE
Refills: 0 | Status: COMPLETED | OUTPATIENT
Start: 2021-11-28 | End: 2021-11-28

## 2021-11-28 RX ADMIN — Medication 1000 MILLIGRAM(S): at 01:00

## 2021-11-28 RX ADMIN — Medication 400 MILLIGRAM(S): at 00:45

## 2021-11-28 RX ADMIN — SODIUM CHLORIDE 2000 MILLILITER(S): 9 INJECTION INTRAMUSCULAR; INTRAVENOUS; SUBCUTANEOUS at 00:45

## 2021-11-28 RX ADMIN — SODIUM CHLORIDE 2000 MILLILITER(S): 9 INJECTION INTRAMUSCULAR; INTRAVENOUS; SUBCUTANEOUS at 01:40

## 2021-11-28 NOTE — ED PROVIDER NOTE - NSICDXPASTSURGICALHX_GEN_ALL_CORE_FT
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

## 2021-11-28 NOTE — ED PROVIDER NOTE - NSICDXPASTMEDICALHX_GEN_ALL_CORE_FT
PAST MEDICAL HISTORY:  Asthma resolved about 15 years ago, ne moeds    Atrial Fibrillation with use of gingko    Benign Meningioma     BPH (Benign Prostatic Hypertrophy)     Diverticulosis colitis    E Coli Infection 2008    Hypercholesterolemia     Hypertension     Lung Infection viral infection 1995    Scarlet Fever     Tinnitus since menigioma surgery

## 2021-11-28 NOTE — ED ADULT NURSE NOTE - OBJECTIVE STATEMENT
75yo male c/o fever "since noon". pt indicates he was exposed to a child who exhibit cold like symptoms. pt denies SOB, body aches. or chest pain

## 2021-11-28 NOTE — ED PROVIDER NOTE - NSFOLLOWUPINSTRUCTIONS_ED_ALL_ED_FT
Viral Syndrome    WHAT YOU NEED TO KNOW:    Viral syndrome is a term used for symptoms of an infection caused by a virus. Viruses are spread easily from person to person through the air and on shared items.    DISCHARGE INSTRUCTIONS:    Call your local emergency number (911 in the [place]) or have someone else call if:   •You have a seizure.      •You cannot be woken.      •You have chest pain or trouble breathing.      Seek care immediately if:   •You have a stiff neck, a bad headache, and sensitivity to light.      •You feel weak, dizzy, or confused.      •You stop urinating or urinate a lot less than usual.      •You cough up blood or thick yellow or green mucus.      •You have severe abdominal pain or your abdomen is larger than usual.      Call your doctor if:   •Your symptoms do not get better with treatment or get worse after 3 days.      •You have a rash or ear pain.      •You have burning when you urinate.      •You have questions or concerns about your condition or care.      Medicines: You may need any of the following:   •Acetaminophen decreases pain and fever. It is available without a doctor's order. Ask how much to take and how often to take it. Follow directions. Read the labels of all other medicines you are using to see if they also contain acetaminophen, or ask your doctor or pharmacist. Acetaminophen can cause liver damage if not taken correctly. Do not use more than 4 grams (4,000 milligrams) total of acetaminophen in one day.       •NSAIDs, such as ibuprofen, help decrease swelling, pain, and fever. NSAIDs can cause stomach bleeding or kidney problems in certain people. If you take blood thinner medicine, always ask your healthcare provider if NSAIDs are safe for you. Always read the medicine label and follow directions.      •Cold medicine helps decrease swelling, control a cough, and relieve chest or nasal congestion.       •Saline nasal spray helps decrease nasal congestion.       •Take your medicine as directed. Contact your healthcare provider if you think your medicine is not helping or if you have side effects. Tell him of her if you are allergic to any medicine. Keep a list of the medicines, vitamins, and herbs you take. Include the amounts, and when and why you take them. Bring the list or the pill bottles to follow-up visits. Carry your medicine list with you in case of an emergency.      Manage your symptoms:   •Drink liquids as directed to prevent dehydration. Ask how much liquid to drink each day and which liquids are best for you. Ask if you should drink an oral rehydration solution (ORS). An ORS has the right amounts of water, salts, and sugar you need to replace body fluids. This may help prevent dehydration caused by vomiting or diarrhea. Do not drink liquids with caffeine. Liquids with caffeine can make dehydration worse.      •Get plenty of rest to help your body heal. Take naps throughout the day. Ask your healthcare provider when you can return to work and your normal activities.      •Use a cool mist humidifier to help you breathe easier. Ask your healthcare provider how to use a cool mist humidifier.      •Eat honey or use cough drops for a sore throat. Cough drops are available without a doctor's order. Follow directions for taking cough drops.      •Do not smoke or be close to anyone who is smoking. Nicotine and other chemicals in cigarettes and cigars can cause lung damage. Smoking can also delay healing. Ask your healthcare provider for information if you currently smoke and need help to quit. E-cigarettes or smokeless tobacco still contain nicotine. Talk to your healthcare provider before you use these products.      Prevent the spread of germs:          •Wash your hands often. Wash your hands several times each day. Wash after you use the bathroom, change a child's diaper, and before you prepare or eat food. Use soap and water every time. Rub your soapy hands together, lacing your fingers. Wash the front and back of your hands, and in between your fingers. Use the fingers of one hand to scrub under the fingernails of the other hand. Wash for at least 20 seconds. Rinse with warm, running water for several seconds. Then dry your hands with a clean towel or paper towel. Use hand  that contains alcohol if soap and water are not available. Do not touch your eyes, nose, or mouth without washing your hands first.  Handwashing           •Cover a sneeze or cough. Use a tissue that covers your mouth and nose. Throw the tissue away in a trash can right away. Use the bend of your arm if a tissue is not available. Wash your hands well with soap and water or use a hand .      •Stay away from others while you are sick. Avoid crowds as much as possible.      •Ask about vaccines you may need. Talk to your healthcare provider about your vaccine history. He or she will tell you which vaccines you need, and when to get them.?Get the influenza (flu) vaccine as soon as recommended each year. The flu vaccine is available starting in September or October. Flu viruses change, so it is important to get a flu vaccine every year.      ?Get the pneumonia vaccine if recommended. This vaccine is usually recommended every 5 years. Your provider will tell you when to get this vaccine, if needed.        Follow up with your doctor as directed: Write down your questions so you remember to ask them during your visits.

## 2021-11-28 NOTE — ED ADULT NURSE NOTE - CHPI ED NUR SYMPTOMS NEG
"  History     Chief Complaint:  Flank Pain and Hematuria    HPI   Philly Triana is a 54 year old female s/p stone removal two weeks ago with stent who presents to the emergency department today via EMS for evaluation of flank pain and hematuria. The patient reports constant left flank pain since her kidney stone removal two weeks ago and yesterday she had \"a lot of blood' in her urine. Her \"backache is worse than it's every been,\" therefore prompting her to call EMS to present to the ED for further evaluation. Upon presentation, she also endorses nauea and abdominal pain. The patient denies fevers and chills.     Allergies:  Cats     Medications:   oxyCODONE IR (ROXICODONE) 5 MG tablet  ciprofloxacin (CIPRO) 250 MG tablet  Amphetamine-Dextroamphetamine (ADDERALL PO)  gabapentin (NEURONTIN) 300 MG capsule  liothyronine (CYTOMEL) 5 MCG tablet  omeprazole (PRILOSEC) 40 MG capsule  albuterol (ALBUTEROL) 108 (90 BASE) MCG/ACT Inhaler  levothyroxine (SYNTHROID/LEVOTHROID) 150 MCG tablet  metoprolol (LOPRESSOR) 100 MG tablet  valACYclovir (VALTREX) 500 MG tablet  zolpidem (AMBIEN) 10 MG tablet  ARIPiprazole (ABILIFY) 2.5 MG TABS  citalopram (CELEXA) 20 MG tablet    Past Medical History:    Chronic pain syndrome  ADHD (attention deficit hyperactivity disorder)   Anxiety and depression   Arthritis   Dysthymic disorder   Esophageal reflux   Essential hypertension, benign   High serum parathyroid hormone   Hypercalcemia   Renal disease   Sleep apnea   Spider veins   Uncomplicated asthma   Hypothyroidism     Past Surgical History:      Varicose veins stripped  Carpal tunnel release rt/lt  Fusion cervical anterior one level  Tonsillectomy  Laser holmium lithotripsy ureter, insert stent, combined  Mammoplasty reduction  Parathyroidectomy  Wrist surgery    Family History:    Heart disease  Hypertension  Breast cancer  COPD  PAD    Social History:  The patient was alone.  Smoking Status: Former  Smokeless Tobacco: " "Former  Alcohol Use: Yes  Marital Status:  Single      Review of Systems   Constitutional: Negative for chills and fever.   Gastrointestinal: Positive for abdominal pain and nausea. Negative for vomiting.   Genitourinary: Positive for flank pain and hematuria. Negative for decreased urine volume, difficulty urinating, dysuria and urgency.   All other systems reviewed and are negative.    Physical Exam   First Vitals:  BP: (!) 145/103  Pulse: 72  Temp: 98.1  F (36.7  C)  Resp: 18  Height: 160 cm (5' 3\")  Weight: 82.6 kg (182 lb)  SpO2: 98 %    Physical Exam  General: Patient is alert and interactive when I enter the room  Head:  The scalp, face, and head appear normal  Eyes:  The pupils are equal, round, and reactive to light    Conjunctivae and sclerae are normal  ENT:    External acoustic canals are normal    The oropharynx is normal without erythema.     Uvula is in the midline  Neck:  Normal range of motion  CV:  Regular rate. S1/S2. No murmurs.   Resp:  Lungs are clear without wheezes or rales. No distress  GI:  Abdomen is soft, no rigidity, guarding, or rebound    No distension. Left flank tenderness.     Left CVA tenderness   MS:  Normal tone. Joints grossly normal without effusions.     No asymmetric leg swelling, calf or thigh tenderness.      Normal motor assessment of all extremities.  Skin:  No rash or lesions noted. Normal capillary refill noted  Neuro: Speech is normal and fluent. Face is symmetric.     Moving all extremities well.   Psych:  Awake. Alert.  Normal affect.  Appropriate interactions.  Lymph: No anterior cervical lymphadenopathy noted    Emergency Department Course     Imaging:  Radiology findings were communicated with the patient who voiced understanding of the findings.  CT Abdomen Pelvis w/o Contrast   IMPRESSION: Probable obstructing stones in the distal left ureter as  above contributing to moderate left hydronephrosis and hydroureter.  Report per radiology     Laboratory:  Laboratory " findings were communicated with the patient who voiced understanding of the findings.  CBC: WNL. (WBC 7.5, HGB 14.5, )   BMP: Creatinine 1.25 (H), GFR Estimate 45 (L) o/w WNL   UA: slightly cloudy yellow urine, large blood, urineketon 5, leukocyte esterase small, RBC z  o/w WNL    Urine Culture Aerobic Bacterial: Pending    Interventions:  1155 Dilaudid 0.5mg IV   1219 Flomax 0.4mg PO  74369 Rocephin 1g IV     Emergency Department Course:  Nursing notes and vitals reviewed.  The patient was sent for a CT Abdomen Pelvis w/o Contrast while in the emergency department, results above.   IV was inserted and blood was drawn for laboratory testing, results above.  The patient provided a urine sample here in the emergency department. This was sent for laboratory testing, findings above.  1020: I performed an exam of the patient as documented above.   1220: Patient rechecked and updated.   1248: I spoke with Dr. Goodrich of the urology service regarding patient's presentation, findings, and plan of care.  1350: Patient rechecked and updated. Patient has stent placement scheduled for tomorrow afternoon at 1600.   Findings and plan explained to the Patient. Patient discharged home with instructions regarding supportive care, medications, and reasons to return. The importance of close follow-up was reviewed. The patient was prescribed Omnicef, ibuprofen, oxycodone, Flomax.   I personally reviewed the laboratory and imaging results with the Patient and answered all related questions prior to discharge.    Impression & Plan      Medical Decision Making:  Philly Triana is a 54 year old female who presents with flank pain.  A broad differential diagnosis was considered including diverticulitis, ureterolithiasis, tumor, colitis, cholecystitis, aneurysm, dissection, volvulus, appendicitis, splenic issue, stomach pathology, ulcer, hydronephrosis, pneumonia, rib fracture, UTI, pyelonephritis, ectopic, ovarian cyst or torsion  and pregnancy amongst many other etiologies.  Signs and symptoms consistent with ureterolithiasis. CT demonstrated probably obstructing stones in the distal left ureter. Patients pain is controlled in ED and given Flomax.  She has not definite signs of sepsis at this point but urnbe is equivocal and I am worried she may be developing a UTI; gave her ceftriaxone and urology will take out stones/put in stent tomorrow.   Patient is hemodynamically stable in ED.  Return for fevers greater than 102, increasing pain, other new symptoms develop.  Ureterolithiasis precautions for home.  Patient is scheduled for stent placement tomorrow 4pm. Questions were answered.     Diagnosis:    ICD-10-CM    1. Left ureteral calculus x 3 N20.1    2.  Left flank pain  3.  UTI  Disposition:  Discharged to home    Discharge Medications:  New Prescriptions    CEFDINIR (OMNICEF) 300 MG CAPSULE    Take 1 capsule (300 mg) by mouth 2 times daily for 7 days    IBUPROFEN (ADVIL/MOTRIN) 600 MG TABLET    Take 1 tablet (600 mg) by mouth every 6 hours for 3 days Take every 6 hours with food for three days.    OXYCODONE IR (ROXICODONE) 5 MG TABLET    Take 0.5-1 tablets (2.5-5 mg) by mouth every 6 hours as needed for moderate to severe pain    TAMSULOSIN (FLOMAX) 0.4 MG CAPSULE    Take 1 capsule (0.4 mg) by mouth daily     Scribe Disclosure:  Liberty STUART, am serving as a scribe at 10:17 AM on 12/4/2017 to document services personally performed by Quentin Damico MD based on my observations and the provider's statements to me.     12/4/2017   [facility] EMERGENCY DEPARTMENT       Quentin Damico MD  12/04/17 [id number]     no chills/no cough/no vomiting

## 2021-11-28 NOTE — ED ADULT NURSE NOTE - NSICDXPASTSURGICALHX_GEN_ALL_CORE_FT
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

## 2021-11-28 NOTE — ED PROVIDER NOTE - CLINICAL SUMMARY MEDICAL DECISION MAKING FREE TEXT BOX
1d fever/body aches/nausea - on ibrance for liposarcoma (risk neutropenia) - symptoms non-specific, exam with fever, otherwise benign, given risk for neutropenia will check sepsis work up with cultures, hydrate, give tylenol and zofran

## 2021-11-28 NOTE — ED PROVIDER NOTE - OBJECTIVE STATEMENT
76 y.o. F with h/o liposarcoma on chemo medication c/o 1 day fever, Tm 103, body aches and nausea, no vomiting, normal bm today, normal urination 76 y.o. F with h/o liposarcoma on chemo medication c/o 1 day fever, Tm 103, body aches and nausea, no vomiting, normal bm today, normal urination, no sinus pain, no rhinorrhea, has chronic tinnitus - improved over the past 2d, no sore throat, no sob, no cough, no rash; denies travel, no known sick contacts, was with family yesterday for thanksgiving - no one known to be sick      PCP = [facility]  Onc = Dr. Hurley, [facility]

## 2021-11-28 NOTE — ED PROVIDER NOTE - ENMT, MLM
Airway patent, Nasal mucosa clear. Mouth with normal mucosa. Throat has no vesicles, no oropharyngeal exudates and uvula is midline. TMs clear bilat

## 2021-11-28 NOTE — ED PROVIDER NOTE - PATIENT PORTAL LINK FT
You can access the FollowMyHealth Patient Portal offered by [facility] by registering at the following website: [email or web address]. By joining [facility] FollowMyHealth portal, you will also be able to view your health information using other applications (apps) compatible with our system.

## 2021-11-28 NOTE — ED PROVIDER NOTE - CARE PROVIDER_API CALL
FRANCISCO PAZ  Internal Medicine  [facility] [place] [address]  Phone: [phone]  Fax: [phone]  Follow Up Time: 1-3 Days

## 2021-11-30 LAB
CULTURE RESULTS: SIGNIFICANT CHANGE UP
SPECIMEN SOURCE: SIGNIFICANT CHANGE UP

## 2021-12-22 ENCOUNTER — APPOINTMENT (OUTPATIENT)
Dept: OTOLARYNGOLOGY | Facility: CLINIC | Age: 76
End: 2021-12-22
Payer: MEDICARE

## 2021-12-22 VITALS — WEIGHT: 138 LBS | BODY MASS INDEX: 22.18 KG/M2 | HEIGHT: 66 IN

## 2021-12-22 DIAGNOSIS — H93.13 TINNITUS, BILATERAL: ICD-10-CM

## 2021-12-22 DIAGNOSIS — H91.90 UNSPECIFIED HEARING LOSS, UNSPECIFIED EAR: ICD-10-CM

## 2021-12-22 DIAGNOSIS — H90.5 UNSPECIFIED SENSORINEURAL HEARING LOSS: ICD-10-CM

## 2021-12-22 PROCEDURE — 92567 TYMPANOMETRY: CPT

## 2021-12-22 PROCEDURE — 92625 TINNITUS ASSESSMENT: CPT

## 2021-12-22 PROCEDURE — 92557 COMPREHENSIVE HEARING TEST: CPT

## 2021-12-22 PROCEDURE — 99204 OFFICE O/P NEW MOD 45 MIN: CPT

## 2021-12-22 RX ORDER — PALBOCICLIB 75 MG/1
TABLET, FILM COATED ORAL
Refills: 0 | Status: ACTIVE | COMMUNITY

## 2021-12-22 RX ORDER — PSYLLIUM HUSK 20 MG/1
520 CAPSULE ORAL
Refills: 0 | Status: ACTIVE | COMMUNITY

## 2021-12-22 RX ORDER — PREDNISONE 10 MG/1
10 TABLET ORAL
Qty: 70 | Refills: 0 | Status: ACTIVE | COMMUNITY
Start: 2021-12-22 | End: 1900-01-01

## 2021-12-22 NOTE — HISTORY OF PRESENT ILLNESS
[de-identified] : 76 year old male last seen 2010, initial visit today for bilateral tinnitus, history of having tinnitus about 7-8 years, liposarcoma of Abdomen s/p partial colectomy, spleen, adrenal gland, partial pancreas and cholecystectomy Dec 2015, taking Ibrance as prescribed.  Reports recurrence tinnitus since March 2021.  Reports bilateral tinnitus, constant, Right worse than Left, nonpulsatile, bothersome, difficulty sleeping at night, distracted during the day.  Reports hearing impairment, has not worsened with tinnitus.  Denies otalgia, otorrhea, dizziness, vertigo, headaches related to ears, recent fevers and ear infections.Tinnitus improved for 8 years when stopped statin - recurred when took blood thinner - had ablation - stopped blood thinner but tinnitus still present - tinnitus largely AS - had menigioma removed 13 years ago - ? side? no vertigo or headaches - no hearing aids. \par Tinnitus changes in terms of tone - tinnitus impacting sleep - uses xanax - helps x 3 hours -\par TIA Feb 2021, s/p MRI brain

## 2021-12-22 NOTE — DATA REVIEWED
[de-identified] : Mild to severe SNHL 3-8kHz Ad. Mild to severe SNHL 2-8kHz As. \par Excellent WRS,AU. Type A tymps, AU.\par Tinnitus pitch matched to 8kHz PT at ~79dBHL. Masked at 76dB 8kHz NBN. As Reference.

## 2021-12-22 NOTE — REASON FOR VISIT
[Spouse] : spouse [Initial Consultation] : an initial consultation for [FreeTextEntry2] : bilateral tinnitus

## 2022-01-14 ENCOUNTER — APPOINTMENT (OUTPATIENT)
Dept: PHARMACY | Facility: CLINIC | Age: 77
End: 2022-01-14
Payer: SELF-PAY

## 2022-01-14 PROCEDURE — V5010 ASSESSMENT FOR HEARING AID: CPT | Mod: NC

## 2022-01-26 ENCOUNTER — APPOINTMENT (OUTPATIENT)
Dept: OTOLARYNGOLOGY | Facility: CLINIC | Age: 77
End: 2022-01-26

## 2023-02-17 NOTE — ED ADULT NURSE NOTE - DISCHARGE DATE/TIME
28-Nov-2021 03:08 Carac Counseling:  I discussed with the patient the risks of Carac including but not limited to erythema, scaling, itching, weeping, crusting, and pain.

## 2023-09-14 ENCOUNTER — TRANSCRIPTION ENCOUNTER (OUTPATIENT)
Age: 78
End: 2023-09-14

## 2023-09-14 ENCOUNTER — EMERGENCY (EMERGENCY)
Facility: HOSPITAL | Age: 78
LOS: 1 days | Discharge: HOME | End: 2023-09-14
Attending: EMERGENCY MEDICINE | Admitting: EMERGENCY MEDICINE
Payer: MEDICARE

## 2023-09-14 VITALS
HEART RATE: 80 BPM | RESPIRATION RATE: 16 BRPM | DIASTOLIC BLOOD PRESSURE: 65 MMHG | TEMPERATURE: 99 F | OXYGEN SATURATION: 95 % | SYSTOLIC BLOOD PRESSURE: 115 MMHG

## 2023-09-14 VITALS
DIASTOLIC BLOOD PRESSURE: 68 MMHG | WEIGHT: 134.04 LBS | HEIGHT: 66 IN | TEMPERATURE: 100 F | OXYGEN SATURATION: 94 % | RESPIRATION RATE: 18 BRPM | HEART RATE: 76 BPM | SYSTOLIC BLOOD PRESSURE: 106 MMHG

## 2023-09-14 DIAGNOSIS — Z90.411 ACQUIRED PARTIAL ABSENCE OF PANCREAS: Chronic | ICD-10-CM

## 2023-09-14 DIAGNOSIS — Z90.81 ACQUIRED ABSENCE OF SPLEEN: Chronic | ICD-10-CM

## 2023-09-14 DIAGNOSIS — Z90.3 ACQUIRED ABSENCE OF STOMACH [PART OF]: Chronic | ICD-10-CM

## 2023-09-14 DIAGNOSIS — Z90.5 ACQUIRED ABSENCE OF KIDNEY: Chronic | ICD-10-CM

## 2023-09-14 LAB
ALBUMIN SERPL ELPH-MCNC: 4.1 G/DL — SIGNIFICANT CHANGE UP (ref 3.3–5)
ALP SERPL-CCNC: 102 U/L — SIGNIFICANT CHANGE UP (ref 30–120)
ALT FLD-CCNC: 19 U/L — SIGNIFICANT CHANGE UP (ref 10–60)
ANION GAP SERPL CALC-SCNC: 12 MMOL/L — SIGNIFICANT CHANGE UP (ref 5–17)
APPEARANCE UR: CLEAR — SIGNIFICANT CHANGE UP
APTT BLD: 34.5 SEC — SIGNIFICANT CHANGE UP (ref 24.5–35.6)
AST SERPL-CCNC: 36 U/L — SIGNIFICANT CHANGE UP (ref 10–40)
BACTERIA # UR AUTO: ABNORMAL /HPF
BASOPHILS # BLD AUTO: 0 K/UL — SIGNIFICANT CHANGE UP (ref 0–0.2)
BASOPHILS NFR BLD AUTO: 0 % — SIGNIFICANT CHANGE UP (ref 0–2)
BILIRUB SERPL-MCNC: 0.3 MG/DL — SIGNIFICANT CHANGE UP (ref 0.2–1.2)
BILIRUB UR-MCNC: NEGATIVE — SIGNIFICANT CHANGE UP
BUN SERPL-MCNC: 19 MG/DL — SIGNIFICANT CHANGE UP (ref 7–23)
CALCIUM SERPL-MCNC: 8.8 MG/DL — SIGNIFICANT CHANGE UP (ref 8.4–10.5)
CHLORIDE SERPL-SCNC: 98 MMOL/L — SIGNIFICANT CHANGE UP (ref 96–108)
CO2 SERPL-SCNC: 24 MMOL/L — SIGNIFICANT CHANGE UP (ref 22–31)
COLOR SPEC: YELLOW — SIGNIFICANT CHANGE UP
CREAT SERPL-MCNC: 1.88 MG/DL — HIGH (ref 0.5–1.3)
DIFF PNL FLD: NEGATIVE — SIGNIFICANT CHANGE UP
EGFR: 37 ML/MIN/1.73M2 — LOW
EOSINOPHIL # BLD AUTO: 0 K/UL — SIGNIFICANT CHANGE UP (ref 0–0.5)
EOSINOPHIL NFR BLD AUTO: 0 % — SIGNIFICANT CHANGE UP (ref 0–6)
EPI CELLS # UR: PRESENT
GLUCOSE SERPL-MCNC: 169 MG/DL — HIGH (ref 70–99)
GLUCOSE UR QL: NEGATIVE MG/DL — SIGNIFICANT CHANGE UP
HCT VFR BLD CALC: 40.4 % — SIGNIFICANT CHANGE UP (ref 39–50)
HGB BLD-MCNC: 13.5 G/DL — SIGNIFICANT CHANGE UP (ref 13–17)
HYALINE CASTS # UR AUTO: PRESENT
INR BLD: 1.09 RATIO — SIGNIFICANT CHANGE UP (ref 0.85–1.18)
KETONES UR-MCNC: NEGATIVE MG/DL — SIGNIFICANT CHANGE UP
LACTATE SERPL-SCNC: 1.9 MMOL/L — SIGNIFICANT CHANGE UP (ref 0.7–2)
LEUKOCYTE ESTERASE UR-ACNC: NEGATIVE — SIGNIFICANT CHANGE UP
LYMPHOCYTES # BLD AUTO: 0.66 K/UL — LOW (ref 1–3.3)
LYMPHOCYTES # BLD AUTO: 10 % — LOW (ref 13–44)
MANUAL SMEAR VERIFICATION: YES — SIGNIFICANT CHANGE UP
MCHC RBC-ENTMCNC: 33.4 GM/DL — SIGNIFICANT CHANGE UP (ref 32–36)
MCHC RBC-ENTMCNC: 36.5 PG — HIGH (ref 27–34)
MCV RBC AUTO: 109.2 FL — HIGH (ref 80–100)
MONOCYTES # BLD AUTO: 0.27 K/UL — SIGNIFICANT CHANGE UP (ref 0–0.9)
MONOCYTES NFR BLD AUTO: 4 % — SIGNIFICANT CHANGE UP (ref 2–14)
NEUTROPHILS # BLD AUTO: 4.32 K/UL — SIGNIFICANT CHANGE UP (ref 1.8–7.4)
NEUTROPHILS NFR BLD AUTO: 64 % — SIGNIFICANT CHANGE UP (ref 43–77)
NEUTS BAND # BLD: 1 % — SIGNIFICANT CHANGE UP (ref 0–8)
NITRITE UR-MCNC: NEGATIVE — SIGNIFICANT CHANGE UP
NRBC # BLD: 0 /100 — SIGNIFICANT CHANGE UP (ref 0–0)
NRBC # BLD: SIGNIFICANT CHANGE UP /100 WBCS (ref 0–0)
PH UR: 5 — SIGNIFICANT CHANGE UP (ref 5–8)
PLAT MORPH BLD: NORMAL — SIGNIFICANT CHANGE UP
PLATELET # BLD AUTO: 223 K/UL — SIGNIFICANT CHANGE UP (ref 150–400)
PLATELET COUNT - ESTIMATE: NORMAL — SIGNIFICANT CHANGE UP
POTASSIUM SERPL-MCNC: 4.1 MMOL/L — SIGNIFICANT CHANGE UP (ref 3.5–5.3)
POTASSIUM SERPL-SCNC: 4.1 MMOL/L — SIGNIFICANT CHANGE UP (ref 3.5–5.3)
PROT SERPL-MCNC: 7.5 G/DL — SIGNIFICANT CHANGE UP (ref 6–8.3)
PROT UR-MCNC: 30 MG/DL
PROTHROM AB SERPL-ACNC: 12.2 SEC — SIGNIFICANT CHANGE UP (ref 9.5–13)
RBC # BLD: 3.7 M/UL — LOW (ref 4.2–5.8)
RBC # FLD: 13.4 % — SIGNIFICANT CHANGE UP (ref 10.3–14.5)
RBC BLD AUTO: NORMAL — SIGNIFICANT CHANGE UP
RBC CASTS # UR COMP ASSIST: 0 /HPF — SIGNIFICANT CHANGE UP (ref 0–4)
S PYO DNA THROAT QL NAA+PROBE: SIGNIFICANT CHANGE UP
SARS-COV-2 RNA SPEC QL NAA+PROBE: DETECTED
SODIUM SERPL-SCNC: 134 MMOL/L — LOW (ref 135–145)
SP GR SPEC: 1.02 — SIGNIFICANT CHANGE UP (ref 1–1.03)
TROPONIN I, HIGH SENSITIVITY RESULT: 36.1 NG/L — SIGNIFICANT CHANGE UP
UROBILINOGEN FLD QL: 1 MG/DL — SIGNIFICANT CHANGE UP (ref 0.2–1)
VARIANT LYMPHS # BLD: 21 % — HIGH (ref 0–6)
WBC # BLD: 6.64 K/UL — SIGNIFICANT CHANGE UP (ref 3.8–10.5)
WBC # FLD AUTO: 6.64 K/UL — SIGNIFICANT CHANGE UP (ref 3.8–10.5)
WBC UR QL: 0 /HPF — SIGNIFICANT CHANGE UP (ref 0–5)

## 2023-09-14 PROCEDURE — 96374 THER/PROPH/DIAG INJ IV PUSH: CPT

## 2023-09-14 PROCEDURE — 99285 EMERGENCY DEPT VISIT HI MDM: CPT | Mod: FS

## 2023-09-14 PROCEDURE — 85730 THROMBOPLASTIN TIME PARTIAL: CPT

## 2023-09-14 PROCEDURE — 87086 URINE CULTURE/COLONY COUNT: CPT

## 2023-09-14 PROCEDURE — 85610 PROTHROMBIN TIME: CPT

## 2023-09-14 PROCEDURE — 80053 COMPREHEN METABOLIC PANEL: CPT

## 2023-09-14 PROCEDURE — 87635 SARS-COV-2 COVID-19 AMP PRB: CPT

## 2023-09-14 PROCEDURE — 93005 ELECTROCARDIOGRAM TRACING: CPT

## 2023-09-14 PROCEDURE — 87040 BLOOD CULTURE FOR BACTERIA: CPT

## 2023-09-14 PROCEDURE — 81001 URINALYSIS AUTO W/SCOPE: CPT

## 2023-09-14 PROCEDURE — 85025 COMPLETE CBC W/AUTO DIFF WBC: CPT

## 2023-09-14 PROCEDURE — 83605 ASSAY OF LACTIC ACID: CPT

## 2023-09-14 PROCEDURE — 84484 ASSAY OF TROPONIN QUANT: CPT

## 2023-09-14 PROCEDURE — 93010 ELECTROCARDIOGRAM REPORT: CPT

## 2023-09-14 PROCEDURE — 87798 DETECT AGENT NOS DNA AMP: CPT

## 2023-09-14 PROCEDURE — 71045 X-RAY EXAM CHEST 1 VIEW: CPT | Mod: 26

## 2023-09-14 PROCEDURE — 87651 STREP A DNA AMP PROBE: CPT

## 2023-09-14 PROCEDURE — 99285 EMERGENCY DEPT VISIT HI MDM: CPT | Mod: 25

## 2023-09-14 PROCEDURE — 71045 X-RAY EXAM CHEST 1 VIEW: CPT

## 2023-09-14 PROCEDURE — 96361 HYDRATE IV INFUSION ADD-ON: CPT

## 2023-09-14 PROCEDURE — 36415 COLL VENOUS BLD VENIPUNCTURE: CPT

## 2023-09-14 RX ORDER — SODIUM CHLORIDE 9 MG/ML
1900 INJECTION INTRAMUSCULAR; INTRAVENOUS; SUBCUTANEOUS ONCE
Refills: 0 | Status: COMPLETED | OUTPATIENT
Start: 2023-09-14 | End: 2023-09-14

## 2023-09-14 RX ORDER — ACETAMINOPHEN 500 MG
1000 TABLET ORAL ONCE
Refills: 0 | Status: COMPLETED | OUTPATIENT
Start: 2023-09-14 | End: 2023-09-14

## 2023-09-14 RX ORDER — LIDOCAINE 4 G/100G
10 CREAM TOPICAL ONCE
Refills: 0 | Status: COMPLETED | OUTPATIENT
Start: 2023-09-14 | End: 2023-09-14

## 2023-09-14 RX ADMIN — SODIUM CHLORIDE 1900 MILLILITER(S): 9 INJECTION INTRAMUSCULAR; INTRAVENOUS; SUBCUTANEOUS at 13:29

## 2023-09-14 RX ADMIN — LIDOCAINE 10 MILLILITER(S): 4 CREAM TOPICAL at 13:29

## 2023-09-14 RX ADMIN — Medication 1000 MILLIGRAM(S): at 13:50

## 2023-09-14 RX ADMIN — Medication 400 MILLIGRAM(S): at 13:20

## 2023-09-14 RX ADMIN — SODIUM CHLORIDE 1900 MILLILITER(S): 9 INJECTION INTRAMUSCULAR; INTRAVENOUS; SUBCUTANEOUS at 14:29

## 2023-09-14 RX ADMIN — Medication 1000 MILLIGRAM(S): at 13:35

## 2023-09-14 NOTE — ED PROVIDER NOTE - NSICDXPASTSURGICALHX_GEN_ALL_CORE_FT
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

## 2023-09-14 NOTE — ED PROVIDER NOTE - CLINICAL SUMMARY MEDICAL DECISION MAKING FREE TEXT BOX
77 M hx liposarcoma (on ibrance, stopped 3 days ago due to feeling sick/covid), benign meningioma, hld, htn, s/p gastrectomy, s/p nephrectomy, s/p partial pancreatectomy, s/p splenectomy c/o sore throat. Tested positive for covid 2 days ago. Was prescribed paxlovid by his pmd but states unable to take it due to his sore throat, difficulty swallowing due to pain. Fever, cough, diarrhea. Denies cp, sob, vomiting.    pmd [place]    VSS Temp 100.1, NAD  HEENT - clear  PERRL EOMI  Neck supple  lungs clear  Cor S1S2 RR - MGR  Abd soft nontender, no mass or HSM, no rebound  Ext FROM intact, no edema  Neuro Intact, no deficits.  Skin Warm and dry no rash.    Imp- COVID, RO PNA, Sepsis.  Plan - Labs, CXR, IVF. May need admission depending on results.

## 2023-09-14 NOTE — ED PROVIDER NOTE - CARE PROVIDER_API CALL
William Moyer  Pulmonary Disease  [address] [place] [address]  Phone: [phone]  Fax: [phone]  Follow Up Time:

## 2023-09-14 NOTE — ED PROVIDER NOTE - NS ED ATTENDING STATEMENT MOD
This was a shared visit with the JUAN ALBERTO. I reviewed and verified the documentation and independently performed the documented:

## 2023-09-14 NOTE — ED PROVIDER NOTE - OBJECTIVE STATEMENT
77 M hx liposarcoma (on ibrance, stopped 3 days ago due to feeling sick/covid), benign meningioma, hld, htn, s/p gastrectomy, s/p nephrectomy, s/p partial pancreatectomy, s/p splenectomy c/o sore throat. Tested positive for covid 2 days ago. Was prescribed paxlovid by his pmd but states unable to take it due to his sore throat, difficulty swallowing due to pain. Fever, cough, diarrhea. Denies cp, sob, vomiting.    pmd debi

## 2023-09-14 NOTE — ED ADULT NURSE NOTE - NSFALLUNIVINTERV_ED_ALL_ED
Bed/Stretcher in lowest position, wheels locked, appropriate side rails in place/Call bell, personal items and telephone in reach/Instruct patient to call for assistance before getting out of bed/chair/stretcher/Non-slip footwear applied when patient is off stretcher/Damar to call system/Physically safe environment - no spills, clutter or unnecessary equipment/Purposeful proactive rounding/Room/bathroom lighting operational, light cord in reach

## 2023-09-14 NOTE — ED ADULT TRIAGE NOTE - CHIEF COMPLAINT QUOTE
did home covid test Tuesday and positive and called pmd and sent prescription for paxlovid but did not take. c/o sore throat and cannot swallow

## 2023-09-14 NOTE — ED ADULT NURSE NOTE - NSICDXPASTSURGICALHX_GEN_ALL_CORE_FT
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

## 2023-09-14 NOTE — ED ADULT NURSE NOTE - OBJECTIVE STATEMENT
Pt is alert and oriented. Pt states that he tested positive for covid on Tuesday. Pt states that he has a cough, sore throat, fever, diarrhea and weakness for a few days. Pt states that he was started on Paxlovid but has not taken it due to sore throat. Pt denies sob, chest pain, nausea, vomiting, and dizziness. Pt resp are even and unlabored, skin color comfort for race. Pt updated on plan of care. Pt placed on tele. Tele tech aware.

## 2023-09-14 NOTE — ED PROVIDER NOTE - PATIENT PORTAL LINK FT
You can access the FollowMyHealth Patient Portal offered by [facility] by registering at the following website: [email or web address]. By joining [employer]’s FollowMyHealth portal, you will also be able to view your health information using other applications (apps) compatible with our system.

## 2023-09-14 NOTE — ED PROVIDER NOTE - NSFOLLOWUPINSTRUCTIONS_ED_ALL_ED_FT
Drink plenty of fluids.  Home remedies as discussed.  Tylenol as needed for pain, fever.  Return for worsening or concerning symptoms.          Breast Tenderness  Breast tenderness is a common problem for women of all ages, but may also occur in men. Breast tenderness has many possible causes, including hormone changes, infections, taking certain medicines, and caffeine intake. In women, the pain usually comes and goes with the menstrual cycle, but it can also be constant. Breast tenderness may range from mild discomfort to severe pain.    You may have tests, such as a mammogram or an ultrasound, to check for any unusual findings. Having breast tenderness usually does not mean that you have breast cancer.    Follow these instructions at home:  Managing pain and discomfort    Bag of ice on a towel on the skin.   If directed, put ice on the painful area. To do this:  Put ice in a plastic bag.  Place a towel between your skin and the bag.  Leave the ice on for 20 minutes, 2–3 times a day.  If your skin turns bright red, remove the ice right away to prevent skin damage. The risk of skin damage is higher if you cannot feel pain, heat, or cold.  Wear a supportive bra or chest support:  During exercise.  While sleeping, if your breasts are very tender.  Medicines    Take over-the-counter and prescription medicines only as told by your health care provider. If the cause of your pain is an infection, you may be prescribed an antibiotic medicine.  If you were prescribed antibiotics, take them as told by your health care provider. Do not stop using the antibiotic even if you start to feel better.  Eating and drinking    Decrease the amount of caffeine in your diet. Instead, drink more water and choose caffeine-free drinks.  Your health care provider may recommend that you lessen the amount of fat in your diet. You can do this by:  Limiting fried foods.  Cooking foods using methods such as baking, boiling, grilling, and broiling.  General instructions    A person writing in a journal.  Keep a log of the days and times when your breasts are most tender.  Ask your health care provider how to do breast exams at home. This will help you notice if you have an unusual growth or lump.  Keep all follow-up visits.  Contact a health care provider if:  Any part of your breast is hard, red, and hot to the touch. This may be a sign of infection.  You are a woman and have a new or painful lump in your breast that remains after your menstrual period ends.  You are not breastfeeding and you have fluid, especially blood or pus, coming out of your nipples.  You have a fever.  Your pain does not improve or it gets worse.  Your pain is interfering with your daily activities.  Summary  Breast tenderness may range from mild discomfort to severe pain.  Breast tenderness has many possible causes, including hormone changes, infections, taking certain medicines, and caffeine intake.  It can be treated with ice, wearing a supportive bra or chest support, and medicines.  Make changes to your diet as told by your health care provider.  This information is not intended to replace advice given to you by your health care provider. Make sure you discuss any questions you have with your health care provider. Drink plenty of fluids.  Home remedies as discussed.  Tylenol as needed for pain, fever.  Return for worsening or concerning symptoms.    COVID-19  COVID-19, or coronavirus disease 2019, is an infection that is caused by a new (novel) coronavirus called SARS-CoV-2. COVID-19 can cause many symptoms. In some people, the virus may not cause any symptoms. In others, it may cause mild or severe symptoms. Some people with severe infection develop severe disease.    What are the causes?  The human body, showing how the coronavirus travels from the air to a person's lungs.  This illness is caused by a virus. The virus may be in the air as tiny specks of fluid (aerosols) or droplets, or it may be on surfaces. You may catch the virus by:  Breathing in droplets from an infected person. Droplets can be spread by a person breathing, speaking, singing, coughing, or sneezing.  Touching something, like a table or a doorknob, that has virus on it (is contaminated) and then touching your mouth, nose, or eyes.  What increases the risk?  Risk for infection:    You are more likely to get infected with the COVID-19 virus if:  You are within 6 ft (1.8 m) of a person with COVID-19 for 15 minutes or longer.  You are providing care for a person who is infected with COVID-19.  You are in close personal contact with other people. Close personal contact includes hugging, kissing, or sharing eating or drinking utensils.  Risk for serious illness caused by COVID-19:    You are more likely to get seriously ill from the COVID-19 virus if:  You have cancer.  You have a long-term (chronic) disease, such as:  Chronic lung disease. This includes pulmonary embolism, chronic obstructive pulmonary disease, and cystic fibrosis.  Long-term disease that lowers your body's ability to fight infection (immunocompromise).  Serious cardiac conditions, such as heart failure, coronary artery disease, or cardiomyopathy.  Diabetes.  Chronic kidney disease.  Liver diseases. These include cirrhosis, nonalcoholic fatty liver disease, alcoholic liver disease, or autoimmune hepatitis.  You have obesity.  You are pregnant or were recently pregnant.  You have sickle cell disease.  What are the signs or symptoms?  Symptoms of this condition can range from mild to severe. Symptoms may appear any time from 2 to 14 days after being exposed to the virus. They include:  Fever or chills.  Shortness of breath or trouble breathing.  Feeling tired or very tired.  Headaches, body aches, or muscle aches.  Runny or stuffy nose, sneezing, coughing, or sore throat.  New loss of taste or smell. This is rare.  Some people may also have stomach problems, such as nausea, vomiting, or diarrhea.    Other people may not have any symptoms of COVID-19.    How is this diagnosed?  A sample being collected by swabbing the nose.  This condition may be diagnosed by testing samples to check for the COVID-19 virus. The most common tests are the PCR test and the antigen test. Tests may be done in the lab or at home. They include:  Using a swab to take a sample of fluid from the back of your nose and throat (nasopharyngeal fluid), from your nose, or from your throat.  Testing a sample of saliva from your mouth.  Testing a sample of coughed-up mucus from your lungs (sputum).  How is this treated?  Treatment for COVID-19 infection depends on the severity of the condition.  Mild symptoms can be managed at home with rest, fluids, and over-the-counter medicines.  Serious symptoms may be treated in a hospital intensive care unit (ICU). Treatment in the ICU may include:  Supplemental oxygen. Extra oxygen is given through a tube in the nose, a face mask, or a olivia.  Medicines. These may include:  Antivirals, such as monoclonal antibodies. These help your body fight off certain viruses that can cause disease.  Anti-inflammatories, such as corticosteroids. These reduce inflammation and suppress the immune system.  Antithrombotics. These prevent or treat blood clots, if they develop.  Convalescent plasma. This helps boost your immune system, if you have an underlying immunosuppressive condition or are getting immunosuppressive treatments.  Prone positioning. This means you will lie on your stomach. This helps oxygen to get into your lungs.  Infection control measures.  If you are at risk for more serious illness caused by COVID-19, your health care provider may prescribe two long-acting monoclonal antibodies, given together every 6 months.    How is this prevented?  To protect yourself:    Use preventive medicine (pre-exposure prophylaxis). You may get pre-exposure prophylaxis if you have moderate or severe immunocompromise.  Get vaccinated. Anyone 6 months old or older who meets guidelines can get a COVID-19 vaccine or vaccine series. This includes people who are pregnant or making breast milk (lactating).  Get an added dose of COVID-19 vaccine after your first vaccine or vaccine series if you have moderate to severe immunocompromise. This applies if you have had a solid organ transplant or have been diagnosed with an immunocompromising condition.  You should get the added dose 4 weeks after you got the first COVID-19 vaccine or vaccine series.  If you get an mRNA vaccine, you will need a 3-dose primary series.  If you get the J&J/Poornima vaccine, you will need a 2-dose primary series, with the second dose being an mRNA vaccine.  Talk to your health care provider about getting experimental monoclonal antibodies. This treatment is approved under emergency use authorization to prevent severe illness before or after being exposed to the COVID-19 virus. You may be given monoclonal antibodies if:  You have moderate or severe immunocompromise. This includes treatments that lower your immune response. People with immunocompromise may not develop protection against COVID-19 when they are vaccinated.  You cannot be vaccinated. You may not get a vaccine if you have a severe allergic reaction to the vaccine or its components.  You are not fully vaccinated.  You are in a facility where COVID-19 is present and:  Are in close contact with a person who is infected with the COVID-19 virus.  Are at high risk of being exposed to the COVID-19 virus.  You are at risk of illness from new variants of the COVID-19 virus.  To protect others:    If you have symptoms of COVID-19, take steps to prevent the virus from spreading to others.  Stay home. Leave your house only to get medical care. Do not use public transit, if possible.  Do not travel while you are sick.  Wash your hands often with soap and water for at least 20 seconds. If soap and water are not available, use alcohol-based hand .  Make sure that all people in your household wash their hands well and often.  Cough or sneeze into a tissue or your sleeve or elbow. Do not cough or sneeze into your hand or into the air.  Where to find more information  Centers for Disease Control and Prevention: www.cdc.gov/coronavirus  World Health Organization: www.who.int/health-topics/coronavirus  Get help right away if:  You have trouble breathing.  You have pain or pressure in your chest.  You are confused.  You have bluish lips and fingernails.  You have trouble waking from sleep.  You have symptoms that get worse.  These symptoms may be an emergency. Get help right away. Call 911.  Do not wait to see if the symptoms will go away.  Do not drive yourself to the hospital.  Summary  COVID-19 is an infection that is caused by a new coronavirus.  Sometimes, there are no symptoms. Other times, symptoms range from mild to severe. Some people with a severe COVID-19 infection develop severe disease.  The virus that causes COVID-19 can spread from person to person through droplets or aerosols from breathing, speaking, singing, coughing, or sneezing.  Mild symptoms of COVID-19 can be managed at home with rest, fluids, and over-the-counter medicines.  This information is not intended to replace advice given to you by your health care provider. Make sure you discuss any questions you have with your health care provider.

## 2023-09-15 LAB
CULTURE RESULTS: NO GROWTH — SIGNIFICANT CHANGE UP
SPECIMEN SOURCE: SIGNIFICANT CHANGE UP

## 2024-10-22 NOTE — ED ADULT TRIAGE NOTE - RESPIRATORY RATE (BREATHS/MIN)
18
Anesthesia Type: 1% lidocaine with epinephrine
Anesthesia Volume In Cc: 0.5
Add Associated Diagnoses If Applicable When Selecting Medical Necessity: Yes
Consent: Written consent obtained and the risks of skin tag removal was reviewed with the patient including but not limited to bleeding, pigmentary change, infection, pain, and remote possibility of scarring.
Medical Necessity Information: It is in your best interest to select a reason for this procedure from the list below. All of these items fulfill various CMS LCD requirements except the new and changing color options.
Medical Necessity Clause: This procedure was medically necessary because the lesions that were treated were:
Detail Level: Detailed
Include Z78.9 (Other Specified Conditions Influencing Health Status) As An Associated Diagnosis?: No
Hemostasis: Aluminum Chloride

## 2024-12-10 RX ORDER — ALPRAZOLAM 0.25 MG/1
0.25 TABLET ORAL
Qty: 60 | Refills: 0 | Status: ACTIVE | COMMUNITY
Start: 2024-12-10 | End: 1900-01-01

## 2025-04-01 ENCOUNTER — APPOINTMENT (OUTPATIENT)
Facility: CLINIC | Age: 80
End: 2025-04-01
Payer: MEDICARE

## 2025-04-01 VITALS
OXYGEN SATURATION: 94 % | WEIGHT: 138 LBS | TEMPERATURE: 98.4 F | HEART RATE: 60 BPM | SYSTOLIC BLOOD PRESSURE: 110 MMHG | RESPIRATION RATE: 17 BRPM | DIASTOLIC BLOOD PRESSURE: 73 MMHG | BODY MASS INDEX: 22.18 KG/M2 | HEIGHT: 66 IN

## 2025-04-01 DIAGNOSIS — K21.9 GASTRO-ESOPHAGEAL REFLUX DISEASE W/OUT ESOPHAGITIS: ICD-10-CM

## 2025-04-01 DIAGNOSIS — K59.09 OTHER CONSTIPATION: ICD-10-CM

## 2025-04-01 DIAGNOSIS — N39.0 URINARY TRACT INFECTION, SITE NOT SPECIFIED: ICD-10-CM

## 2025-04-01 DIAGNOSIS — Z95.0 PRESENCE OF CARDIAC PACEMAKER: ICD-10-CM

## 2025-04-01 DIAGNOSIS — I63.9 CEREBRAL INFARCTION, UNSPECIFIED: ICD-10-CM

## 2025-04-01 DIAGNOSIS — I10 ESSENTIAL (PRIMARY) HYPERTENSION: ICD-10-CM

## 2025-04-01 DIAGNOSIS — C49.9 MALIGNANT NEOPLASM OF CONNECTIVE AND SOFT TISSUE, UNSPECIFIED: ICD-10-CM

## 2025-04-01 DIAGNOSIS — D64.9 ANEMIA, UNSPECIFIED: ICD-10-CM

## 2025-04-01 PROCEDURE — 99213 OFFICE O/P EST LOW 20 MIN: CPT

## 2025-04-01 PROCEDURE — G2211 COMPLEX E/M VISIT ADD ON: CPT

## 2025-04-24 NOTE — ED ADULT TRIAGE NOTE - BEFAST ARM NUMBNESS
Yes

## 2025-06-02 NOTE — ED PROVIDER NOTE - TOBACCO USE
Unknown if ever smoked Anxiety   F41.9  Opioid dependence on agonist therapy   F11.20  Substance use disorder   F19.90  MDD (major depressive disorder)   F32.9  Seizure disorder   G40.909